# Patient Record
Sex: MALE | Race: WHITE | NOT HISPANIC OR LATINO | Employment: FULL TIME | URBAN - METROPOLITAN AREA
[De-identification: names, ages, dates, MRNs, and addresses within clinical notes are randomized per-mention and may not be internally consistent; named-entity substitution may affect disease eponyms.]

---

## 2024-08-30 ENCOUNTER — THERAPY VISIT (OUTPATIENT)
Dept: PHYSICAL THERAPY | Facility: CLINIC | Age: 29
End: 2024-08-30
Payer: COMMERCIAL

## 2024-08-30 DIAGNOSIS — M25.512 ACUTE PAIN OF LEFT SHOULDER: Primary | ICD-10-CM

## 2024-08-30 PROCEDURE — 97110 THERAPEUTIC EXERCISES: CPT | Mod: GP | Performed by: PHYSICAL THERAPIST

## 2024-08-30 PROCEDURE — 97161 PT EVAL LOW COMPLEX 20 MIN: CPT | Mod: GP | Performed by: PHYSICAL THERAPIST

## 2024-08-30 ASSESSMENT — ACTIVITIES OF DAILY LIVING (ADL)
WASHING_YOUR_BACK: 3
PLEASE_INDICATE_YOR_PRIMARY_REASON_FOR_REFERRAL_TO_THERAPY:: SHOULDER
SHOPPING: NO DIFFICULTY
PERFORMING_HEAVY_ACTIVITIES_AROUND_YOUR_HOME: A LITTLE BIT OF DIFFICULTY
LIFTING_AN_OBJECT,_LIKE_A_BAG_OF_GROCERIES_FROM_THE_FLOOR: A LITTLE BIT OF DIFFICULTY
RUNNING_ON_UNEVEN_GROUND: NO DIFFICULTY
PUSHING_WITH_THE_INVOLVED_ARM: 1
MAKING_SHARP_TURNS_WHILE_RUNNING_FAST: A LITTLE BIT OF DIFFICULTY
CARRYING_A_HEAVY_OBJECT_OF_10_POUNDS: 3
WASHING_YOUR_HAIR?: 0
GETTING_INTO_AND_OUT_OF_A_BATH: NO DIFFICULTY
PLACING_AN_OBJECT_ON_A_HIGH_SHELF: 4
LEFS_SCORE(%): 0
REACHING_FOR_SOMETHING_ON_A_HIGH_SHELF: 1
WALKING_BETWEEN_ROOMS: NO DIFFICULTY
SQUATTING: NO DIFFICULTY
PUTTING_ON_YOUR_PANTS: 1
LEFS_RAW_SCORE: 0
WALKING_A_MILE: NO DIFFICULTY
RUNNING_ON_EVEN_GROUND: NO DIFFICULTY
REMOVING_SOMETHING_FROM_YOUR_BACK_POCKET: 0
GETTING_INTO_OR_OUT_OF_A_CAR: A LITTLE BIT OF DIFFICULTY
ANY_OF_YOUR_USUAL_WORK,_HOUSEWORK_OR_SCHOOL_ACTIVITIES: NO DIFFICULTY
WHEN_LYING_ON_THE_INVOLVED_SIDE: 1
PUTTING_ON_AN_UNDERSHIRT_OR_A_PULLOVER_SWEATER: 2
PUTTING_ON_YOUR_SHOES_OR_SOCKS: NO DIFFICULTY
TOUCHING_THE_BACK_OF_YOUR_NECK: 0
PLEASE_INDICATE_YOR_PRIMARY_REASON_FOR_REFERRAL_TO_THERAPY:: FOOT AND/OR ANKLE
AT_ITS_WORST?: 1
WALKING_2_BLOCKS: NO DIFFICULTY
PERFORMING_LIGHT_ACTIVITIES_AROUND_YOUR_HOME: A LITTLE BIT OF DIFFICULTY
PUTTING_ON_A_SHIRT_THAT_BUTTONS_DOWN_THE_FRONT: 0
SITTING_FOR_1_HOUR: NO DIFFICULTY
GOING_UP_OR_DOWN_10_STAIRS: NO DIFFICULTY
YOUR_USUAL_HOBBIES,_RECREATIONAL_OR_SPORTING_ACTIVITIES: MODERATE DIFFICULTY
STANDING_FOR_1_HOUR: MODERATE DIFFICULTY
ROLLING_OVER_IN_BED: NO DIFFICULTY

## 2024-08-30 NOTE — PROGRESS NOTES
PHYSICAL THERAPY EVALUATION  Type of Visit: Evaluation       Fall Risk Screen:  Fall screen completed by: PT  Have you fallen 2 or more times in the past year?: No  Have you fallen and had an injury in the past year?: No  Is patient a fall risk?: No    Subjective  Pt reports that last year he was in [place] and had his left shoulder reduced on site after having a dislocation event improved with therapy at the time. New dislocation event and self reduced on a few months ago with the arm abducted and extended behind his body. Partial subluxation R shoulder in November and didn't seek care at the time there. Pain is worst with reaching/lifting, more dynamic activites. Denies vague symptoms. Would like to get rid of this pain and return to PLOF pain free. Wants to rock climb without issue, not have to think about the shoulder. Some clicking/popping in the right shoulder vs the left, some feeling of instability in the left shoulder. Otherwise moutain bikes and is very active.           Presenting condition or subjective complaint: left shoulder dislocation  Date of onset: 08/30/24    Relevant medical history:     Dates & types of surgery: hernia 1993    Prior diagnostic imaging/testing results: MRI; X-ray     Prior therapy history for the same diagnosis, illness or injury: Yes      Living Environment  Social support: Alone   Type of home: Apartment/condo   Stairs to enter the home:         Ramp: No   Stairs inside the home: No       Help at home: None  Equipment owned:       Employment: Yes   Hobbies/Interests: climbing mountain biking hiking guitar    Patient goals for therapy: rock climb and be comfortable rock scrambling    Pain assessment: Pain present  Location: lateral left arm/Rating: other     Objective   Posture: rounded shouders    Scapular positioning: mild medial/inferior border atrophy juan c shoulders left worse than right with eccentric flexion/abduction    *=painful    Shoulder AROM (* =  pain) Right Left           180    180*   ER/HBH 60 55   IR/HBB T10 T10     Shoulder PROM (* = pain) Right Left    170*    170*   ER In 90 abduction 90 In 90 abduction 85   IR In 90 abduction 60 In 90 abduction 60     Shoulder Strength (* = pain) Right Left   FL 5/5 5-/5   ABD 5/5 4/5   ER (0 abduction) 5/5 4+/5   ER (90 abduction) 4/5 4-/5*   IR 5/5 5/5   Biceps 5/5 5/5   Middle Trapezius 4/5 4-/5*   Lower Trapezius 4/5 4-/5*     Palpation tenderness: unremarkable    Other Tests: none    Assessment & Plan   CLINICAL IMPRESSIONS  Medical Diagnosis: Dislocation of left shoulder    Treatment Diagnosis: left shoulder pain   Impression/Assessment: Patient is a 28 year old male with right shoulder complaints.  The following significant findings have been identified: Pain, Decreased ROM/flexibility, Decreased joint mobility, Decreased strength, Impaired muscle performance, Decreased activity tolerance, and Impaired posture. These impairments interfere with their ability to perform self care tasks, work tasks, recreational activities, household chores, driving , household mobility, and community mobility as compared to previous level of function.     Clinical Decision Making (Complexity):  Clinical Presentation: Stable/Uncomplicated  Clinical Presentation Rationale: based on medical and personal factors listed in PT evaluation  Clinical Decision Making (Complexity): Low complexity    PLAN OF CARE  Treatment Interventions:  Interventions: Gait Training, Manual Therapy, Neuromuscular Re-education, Therapeutic Activity, Therapeutic Exercise    Long Term Goals     PT Goal 1  Goal Identifier: Reaching  Goal Description: Pt will be able to reach overhead, out to the side, behind the back and cross body pain free in order to reach cabinets, for dressing, and ADls  Rationale: to maximize safety and independence with performance of ADLs and functional tasks;to maximize safety and independence within the  home;to maximize safety and independence within the community;to maximize safety and independence with transportation;to maximize safety and independence with self cares  Goal Progress: new goal  Target Date: 10/25/24  PT Goal 2  Goal Identifier: Lifting  Goal Description: Pt will be able to lift > 5 pounds overhead pain free in order to place things into high spaces with ADLs at home, and perform daily tasks at home  Rationale: to maximize safety and independence with performance of ADLs and functional tasks;to maximize safety and independence within the home;to maximize safety and independence within the community;to maximize safety and independence with transportation;to maximize safety and independence with self cares  Goal Progress: new goal  Target Date: 10/25/24      Frequency of Treatment: 1 x week  Duration of Treatment: 8 weeks    Recommended Referrals to Other Professionals: none  Education Assessment:   Learner/Method: Patient;No Barriers to Learning  Education Comments: no concerns    Risks and benefits of evaluation/treatment have been explained.   Patient/Family/caregiver agrees with Plan of Care.     Evaluation Time:     PT Eval, Low Complexity Minutes (07747): 30     Signing Clinician: William Person PT

## 2024-09-24 ENCOUNTER — THERAPY VISIT (OUTPATIENT)
Dept: PHYSICAL THERAPY | Facility: CLINIC | Age: 29
End: 2024-09-24
Payer: COMMERCIAL

## 2024-09-24 DIAGNOSIS — M25.512 ACUTE PAIN OF LEFT SHOULDER: Primary | ICD-10-CM

## 2024-09-24 PROCEDURE — 97112 NEUROMUSCULAR REEDUCATION: CPT | Mod: GP | Performed by: PHYSICAL THERAPIST

## 2024-09-24 PROCEDURE — 97110 THERAPEUTIC EXERCISES: CPT | Mod: GP | Performed by: PHYSICAL THERAPIST

## 2024-10-28 ENCOUNTER — THERAPY VISIT (OUTPATIENT)
Dept: PHYSICAL THERAPY | Facility: CLINIC | Age: 29
End: 2024-10-28
Payer: COMMERCIAL

## 2024-10-28 DIAGNOSIS — M25.512 ACUTE PAIN OF LEFT SHOULDER: Primary | ICD-10-CM

## 2024-10-28 PROCEDURE — 97110 THERAPEUTIC EXERCISES: CPT | Mod: GP | Performed by: PHYSICAL THERAPIST

## 2024-11-12 ENCOUNTER — THERAPY VISIT (OUTPATIENT)
Dept: PHYSICAL THERAPY | Facility: CLINIC | Age: 29
End: 2024-11-12
Payer: COMMERCIAL

## 2024-11-12 DIAGNOSIS — M25.512 ACUTE PAIN OF LEFT SHOULDER: Primary | ICD-10-CM

## 2024-11-12 PROCEDURE — 97110 THERAPEUTIC EXERCISES: CPT | Mod: GP | Performed by: PHYSICAL THERAPIST

## 2024-11-12 NOTE — PROGRESS NOTES
PLAN  Patient was responding well to PT with full active and passive range and near 5/5 in all planes expect ER90 L shoulder. Performed unstable pushups, x 5 pullups, closed kinetic chain test x 16 in 15 sec without pain. At end of session while attempting [place] Island[place] gettup with 10# in right arm and side planking onto the left arm, felt a quick dislocation event in the left arm/instability. After laying down and gently abducting the left arm his shoulder reduced and ice was administered with education on icing tonight. Will call patient in the morning to discuss plan of care and see how he's feeling. Will change plan depending on patient response. Compass report submitted.        11/12/24 0500   Appointment Info   Signing clinician's name / credentials William Person, PT, DPT   Total/Authorized Visits ET   Visits Used 4   Medical Diagnosis Dislocation of left shoulder   PT Tx Diagnosis left shoulder pain   Other pertinent information rocking climbing,   Progress Note/Certification   Onset of illness/injury or Date of Surgery 08/30/24   Therapy Frequency 1 x week   Predicted Duration 8 weeks   Progress Note Due Date 10/25/24   Progress Note Completed Date 08/30/24   GOALS   PT Goals 2   PT Goal 1   Goal Identifier Reaching   Goal Description Pt will be able to reach overhead, out to the side, behind the back and cross body pain free in order to reach cabinets, for dressing, and ADls   Rationale to maximize safety and independence with performance of ADLs and functional tasks;to maximize safety and independence within the home;to maximize safety and independence within the community;to maximize safety and independence with transportation;to maximize safety and independence with self cares   Goal Progress mild progress   Target Date 10/25/24   PT Goal 2   Goal Identifier Lifting   Goal Description Pt will be able to lift > 5 pounds overhead pain free in order to place things into high spaces with ADLs at home, and  perform daily tasks at home   Rationale to maximize safety and independence with performance of ADLs and functional tasks;to maximize safety and independence within the home;to maximize safety and independence within the community;to maximize safety and independence with transportation;to maximize safety and independence with self cares   Goal Progress was progressing   Target Date 10/25/24   Subjective Report   Subjective Report Patricio reports that his shoulder is doing pretty good overall. The hanging exercise is really helpful. After dislocation, pain mostly reduced, mainly shocked by nature of the event   Objective Measures   Objective Measures Objective Measure 1;Objective Measure 2;Objective Measure 3;Objective Measure 4   Objective Measure 1   Objective Measure AROM   Details Flexion 175, Abduction 175, ER in 90 abduction 90, IR in 90 abduction 70   Objective Measure 2   Objective Measure MMT   Details ER 5/5, IR 5/5, Flexion 5/5, Abduction 5/5, MT 5/5*, LT 5/5, ER90 4/5   Objective Measure 3   Objective Measure PROM   Details Flexion 175, ER in 90 abduction 87, IR in 90 abduction 70   Objective Measure 4   Objective Measure dislocation event   Details spontaneous reduction with gentle 45 degrees abduction by PT   Treatment Interventions (PT)   Interventions Therapeutic Procedure/Exercise;Neuromuscular Re-education   Therapeutic Procedure/Exercise   Therapeutic Procedures: strength, endurance, ROM, flexibility minutes (73820) 30   Therapeutic Procedures Ther Proc 2   Ther Proc 1 Education   Ther Proc 1 - Details Discussed POC , tello/phys, HEP freq, activity mod   PTRx Ther Proc 1 Shoulder External Rotation Sidelying   PTRx Ther Proc 1 - Details progressed to 5# x 20   PTRx Ther Proc 2 Ball Prone Scapula I to T   PTRx Ther Proc 2 - Details HEP   PTRx Ther Proc 3 Ball Prone Scapula Y   PTRx Ther Proc 3 - Details HEP   PTRx Ther Proc 4 Upper Extremity CKC Functional Test    PTRx Ther Proc 4 - Details HEP   PTRx  Ther Proc 5 Theraband Shoulder External Rotation at 90/90   PTRx Ther Proc 5 - Details added press x 10B, tolerates well   PTRx Ther Proc 6 Pushups   PTRx Ther Proc 6 - Details back and forth x  20   PTRx Ther Proc 7 Shoulder Abduction   PTRx Ther Proc 7 - Details NT   Skilled Intervention did well until dislocation with [place] gettup   Patient Response/Progress did well until instability event   Ther Proc 2 [place] gettup   Ther Proc 2 - Details attempted with 10# with weight in right arm, while rolling onto the left shoulder he felt a dislocation event in the left shoulder, this reduced within 5 min after laying down with PT assited gentle abduction of the left shoulder.   PTRx Ther Proc 8 Lat Pulldown   PTRx Ther Proc 8 - Details x 5 pullups   Therapeutic Activity   PTRx Ther Act 1 Education Sheet Shoulder   PTRx Ther Act 1 - Details HEP   Neuromuscular Re-education   Neuromuscular re-ed of mvmt, balance, coord, kinesthetic sense, posture, proprioception minutes (68984) 5   PTRx Neuro Re-ed 1 Rowing with Shoulders Up and Back   PTRx Neuro Re-ed 1 - Details HEP   PTRx Neuro Re-ed 2 Shoulder Theraband Low Row/Pulldown   PTRx Neuro Re-ed 2 - Details HEP   PTRx Neuro Re-ed 3 BOSU Ball Push Up   PTRx Neuro Re-ed 3 - Details x 10   PTRx Neuro Re-ed 4 BOSU High Plank with Weight Shift   PTRx Neuro Re-ed 4 - Details x 10   Skilled Intervention cues for form   Patient Response/Progress tolerates well overall   Neuro Re-ed 1 pushup with ball roll   Neuro Re-ed 1 - Details x 10   Education   Learner/Method Patient;No Barriers to Learning   Education Comments no concerns   Plan   Home program Ptrx HEP   Plan for next session PN, dislocation/instability event   Comments   Comments recheck after instability event.   Total Session Time   Timed Code Treatment Minutes 35   Total Treatment Time (sum of timed and untimed services) 35     Unbilled time at end of session for gentle ice/education after compass/dislocation at end of  session. Will call patient 11/13/24 in the morning.       Beginning/End Dates of Progress Note Reporting Period:  08/30/24 to 11/12/2024    Referring Provider:  Referred Self    Inquires  William Person PT, DPT, CSCS  Physical Therapist  [facility] [address], [address] [address] [phone]

## 2024-11-13 ENCOUNTER — TELEPHONE (OUTPATIENT)
Dept: PHYSICAL THERAPY | Facility: CLINIC | Age: 29
End: 2024-11-13
Payer: COMMERCIAL

## 2024-11-13 DIAGNOSIS — M25.512 ACUTE PAIN OF LEFT SHOULDER: Primary | ICD-10-CM

## 2024-11-25 ENCOUNTER — THERAPY VISIT (OUTPATIENT)
Dept: PHYSICAL THERAPY | Facility: CLINIC | Age: 29
End: 2024-11-25
Payer: COMMERCIAL

## 2024-11-25 DIAGNOSIS — M25.512 ACUTE PAIN OF LEFT SHOULDER: Primary | ICD-10-CM

## 2024-11-25 PROCEDURE — 97110 THERAPEUTIC EXERCISES: CPT | Mod: GP | Performed by: PHYSICAL THERAPIST

## 2024-11-25 PROCEDURE — 97530 THERAPEUTIC ACTIVITIES: CPT | Mod: GP | Performed by: PHYSICAL THERAPIST

## 2024-11-25 NOTE — PROGRESS NOTES
Patricio is now two week s/p new dislocation event (3rd dislocation since Dec of 2023(other date in July 2024). Has improved since this event with some guarding, feelings of instability, and residual weakness. Will see Dr. Arora for further opinion on his left shoulder and continue PT in the meantime. His goal is to return to high level climbing and clinical concern currently is shoulders ability to stabilize under fatigued conditions. (This is the prior mechanism for previous dislocations).    PLAN  Continue therapy per current plan of care.     11/25/24 0500   Appointment Info   Signing clinician's name / credentials William Person, PT, DPT   Total/Authorized Visits ET   Visits Used 5   Medical Diagnosis Dislocation of left shoulder   PT Tx Diagnosis left shoulder pain   Precautions/Limitations 3 dislocations now   Other pertinent information rocking climbing,   Progress Note/Certification   Onset of illness/injury or Date of Surgery 08/30/24   Therapy Frequency 1 x week   Predicted Duration 8 weeks   Progress Note Due Date 10/25/24   Progress Note Completed Date 08/30/24   GOALS   PT Goals 2   PT Goal 1   Goal Identifier Reaching   Goal Description Pt will be able to reach overhead, out to the side, behind the back and cross body pain free in order to reach cabinets, for dressing, and ADls   Rationale to maximize safety and independence with performance of ADLs and functional tasks;to maximize safety and independence within the home;to maximize safety and independence within the community;to maximize safety and independence with transportation;to maximize safety and independence with self cares   Goal Progress mild progress   Target Date 10/25/24   PT Goal 2   Goal Identifier Lifting   Goal Description Pt will be able to lift > 5 pounds overhead pain free in order to place things into high spaces with ADLs at home, and perform daily tasks at home   Rationale to maximize safety and independence with performance of ADLs  and functional tasks;to maximize safety and independence within the home;to maximize safety and independence within the community;to maximize safety and independence with transportation;to maximize safety and independence with self cares   Goal Progress was progressing   Target Date 10/25/24   Subjective Report   Subjective Report nearly two weeks since dislocation. Keeping up with HEP given via PTrx by PT and shoulder has been pain free since 5 days post injury, Does feel a little bit more loose. Has been able to trail ride and mountain bike without issue.   Objective Measures   Objective Measures Objective Measure 1;Objective Measure 2;Objective Measure 3;Objective Measure 4   Objective Measure 1   Objective Measure AROM   Details Flexion 175, Abduction 175, ER 70, IR T10   Objective Measure 2   Objective Measure MMT   Details ER 5/5, IR 5/5, Flexion 5-/5, Abduction 5-/5(guarding)   Objective Measure 3   Objective Measure PROM   Details Flexion 170(guarded), ER in 90 abduction 85(guarded), IR in 90 abduction 55*(guarded)   Objective Measure 4   Objective Measure scapular rhythm   Details no winging with concentric/eccentric flexion/abduction today   Treatment Interventions (PT)   Interventions Therapeutic Procedure/Exercise;Neuromuscular Re-education;Therapeutic Activity   Therapeutic Procedure/Exercise   Therapeutic Procedures: strength, endurance, ROM, flexibility minutes (78457) 15   Therapeutic Procedures Ther Proc 2   Ther Proc 1 Education   Ther Proc 1 - Details Discussed POC , tello/phys, HEP freq, activity mod   PTRx Ther Proc 1 Shoulder External Rotation Sidelying   PTRx Ther Proc 1 - Details 2 pounds x 40 good challenge overall   PTRx Ther Proc 2 Ball Prone Scapula I to T   PTRx Ther Proc 2 - Details no weight x  20    PTRx Ther Proc 3 Ball Prone Scapula Y   PTRx Ther Proc 3 - Details no weight x 20   PTRx Ther Proc 4 Upper Extremity CKC Functional Test    PTRx Ther Proc 4 - Details NT   PTRx Ther Proc 5  Theraband Shoulder External Rotation at 90/90   PTRx Ther Proc 5 - Details NT   PTRx Ther Proc 6 Pushups   PTRx Ther Proc 6 - Details NT   PTRx Ther Proc 7 Shoulder Abduction   PTRx Ther Proc 7 - Details NT   PTRx Ther Proc 8 Lat Pulldown   PTRx Ther Proc 8 - Details NT   Skilled Intervention did well until dislocation with [place] gettup   Patient Response/Progress did well until instability event   PTRx Ther Proc 9 Isometric Shoulder External Rotation   PTRx Ther Proc 9 - Details No Notes   PTRx Ther Proc 10 Shoulder Isometric Internal Rotation   PTRx Ther Proc 10 - Details No Notes   PTRx Ther Proc 11 Isometric Shoulder Flexion   PTRx Ther Proc 11 - Details No Notes   PTRx Ther Proc 12 Shoulder Scaption Full Can   PTRx Ther Proc 12 - Details 5 pounds x 20 challenging    PTRx Ther Proc 13 Bent Over Rows   PTRx Ther Proc 13 - Details discussed progressing    Therapeutic Activity   PTRx Ther Act 1 Education Sheet Shoulder   PTRx Ther Act 1 - Details HEP   Therapeutic Activities: dynamic activities to improve functional performance minutes (11153) 25   Ther Act 1 education/reassessment   Ther Act 1 - Details full reassessment/discussion on current status of left shoulder, strength, instability and discussion on if returning to sports med is needed, upon discussion patient will see Dr. Arora next week to get another opinion on his shoulder to see if surgery is the right decision or not   Skilled Intervention skilled education   Patient Response/Progress verb understanding   Neuromuscular Re-education   Neuro Re-ed 1 pushup with ball roll   Neuro Re-ed 1 - Details NT   PTRx Neuro Re-ed 1 Rowing with Shoulders Up and Back   PTRx Neuro Re-ed 1 - Details HEP   PTRx Neuro Re-ed 2 Shoulder Theraband Low Row/Pulldown   PTRx Neuro Re-ed 2 - Details HEP   PTRx Neuro Re-ed 3 BOSU Ball Push Up   PTRx Neuro Re-ed 3 - Details No Notes   PTRx Neuro Re-ed 4 BOSU High Plank with Weight Shift   PTRx Neuro Re-ed 4 - Details No Notes    Skilled Intervention cues for form   Patient Response/Progress tolerates well overall   PTRx Neuro Re-ed 5 Scapular Stabilization/Proprioception With A Ball   PTRx Neuro Re-ed 5 - Details No Notes   Education   Learner/Method Patient;No Barriers to Learning   Education Comments no concerns   Plan   Home program Ptrx HEP   Plan for next session PN, continue PT in the meantime.   Comments   Comments recheck after instability event.   Total Session Time   Timed Code Treatment Minutes 40   Total Treatment Time (sum of timed and untimed services) 40       Beginning/End Dates of Progress Note Reporting Period:  08/30/24 to 11/25/2024    Referring Provider:  Referred Self

## 2024-12-02 ENCOUNTER — OFFICE VISIT (OUTPATIENT)
Dept: ORTHOPEDICS | Facility: CLINIC | Age: 29
End: 2024-12-02
Payer: COMMERCIAL

## 2024-12-02 VITALS — BODY MASS INDEX: 24.16 KG/M2 | WEIGHT: 145 LBS | HEIGHT: 65 IN

## 2024-12-02 DIAGNOSIS — S43.432A BANKART LESION OF LEFT SHOULDER, INITIAL ENCOUNTER: Primary | ICD-10-CM

## 2024-12-02 PROCEDURE — 99204 OFFICE O/P NEW MOD 45 MIN: CPT | Performed by: STUDENT IN AN ORGANIZED HEALTH CARE EDUCATION/TRAINING PROGRAM

## 2024-12-02 NOTE — PROGRESS NOTES
CC: Left shoulder dislocation    HPI: Patient is a 29-year-old male who presents with 2 episodes of left shoulder dislocation, first occurring 1 year ago.  Patient reports rockclimbing and reaching out with his left arm when he dislocated his left shoulder.  A climber in the area to help him reduce shoulder at that time.  He reports dislocating a second time while hiking in [place] when he reached out for a rock.  He was able to reduce it himself at that time.  He started participating in physical therapy and 2 weeks ago while performing a side plank he felt his shoulder sublux.  He reports little to no pain today in clinic.  No radiating pain or paresthesia.  He has not used any over-the-counter pain medication.  He reports being concerned that he will not be able to perform his hobbies like rockclimbing or hiking and would like to discuss possible surgical intervention.         Patient Active Problem List   Diagnosis    Acute pain of left shoulder        No past medical history on file.     No past surgical history on file.       No current outpatient medications on file.        No Known Allergies     No family history on file.       Social History     Tobacco Use    Smoking status: Not on file    Smokeless tobacco: Not on file   Substance Use Topics    Alcohol use: Not on file            Objective:  Physical Exam:  LUE: No gross deformity of the shoulder.  No open wounds or lacerations.  No surgical incisions.  No erythema or ecchymosis.  No pain to palpation over the clavicle, AC joint, acromion, or scapular spine.  No pain to palpation over the posterior joint line, lateral aspect of the shoulder, or long head of the biceps in the bicipital groove.  Passive range of motion 180 degrees forward flexion, 170 degrees abduction, 90 degrees external rotation, L1 internal rotation.  Active range of motion is equivalent to passive range of motion.  5/5 strength in flexion, abduction, internal and external rotation.   Negative cross body for AC joint pain.  Negative O'Barak's for pain over the AC joint or deep anterior shoulder.  Negative speeds for pain over the bicipital groove.  Negative Jobes for pain.  Positive apprehension with abduction and external rotation.  Positive  Anaya's test for apprehension or mechanical symptoms in the posterior shoulder.  Sensation intact to axillary, radial, median, and ulnar nerve distribution.    Imaging:  XR from 7/2/2024 including 2 views of his left shoulder were reviewed today in clinic.  Normal joint space narrowing.  Shoulder is correct anatomical alignment.  No evidence of fracture, tumor, dislocation    MRI arthrogram from 8/12/2024 was reviewed today in clinic.  Impression: Visible Hill-Sachs lesion.  20 mL a displaced anterior labrum and full-thickness tear of the posterior chondral labral junction intact root.  Intact rotator cuff and biceps tendon    Assessment and Plan: Patient is a 29-year-old male who presents with a history of 2 episodes of left shoulder dislocation, first occurring x 1 year ago and a episode of subluxation occurring x 2 weeks ago.  Clinically he has a positive overhead apprehension test and Kims test.  Radiographically he has a anterior and posterior labral tear.  Given that he has failed conservative treatment including physical therapy and avoidance high risk activities and that this is limiting him from participating in hobbies he enjoys, I believe he is a good conduit candidate for surgical intervention with labral repair and possible remplissage procedure.  We reviewed the procedure, possible risks, and outcome goals with the procedure.  He would like to proceed.  He is unsure if she will be employed at the end of this week and would like to contact the office once he knows his employment outcome.  He is provided with a surgical packet today in clinic.  My  will reach out to him once he can determine his employment.    [place] Bobo PA-C  participated in today's visit as part of his onboarding process.  Dr. Arora reviewed and agrees with plan.      I have read the above assessment and plan and agree.  This is a 29-year-old male who has now had recurrent instability of his left shoulder.  This is affecting his ability to do status desired activities including mountain biking and rockclimbing.  He is trialed physical therapy.  MRI shows a small Hill-Sachs lesion as well as a labral tear from the 3 o'clock position to 8 o'clock position.  I did opt to review his imaging with him today.  We discussed treatment options.  We discussed continued nonoperative management in the form of physical therapy and activity modification.  He continues to have feelings of instability including a recent subluxation event with nonoperative management.  Discussed with him that given his age and activity level, I would suspect he will continue to have instability symptoms moving forward as he is already had 3 instability events.  I discussed with him that with each shoulder dislocation he risks anterior glenoid bone loss and further increases risk of arthritis previous cussed operative intervention in the form of left shoulder arthroscopic labral repair with possible remplissage.  I did describe the operative technique of a labral repair and remplissage.  Discussed him that I would use an an intraoperative exam under anesthesia to help determine the necessity of the remplissage procedure.  I described the postoperative protocol.  Discussed the risk and benefits of operative intervention including but not limited to bleeding, infection, failure to cure pain, stiffness, posttraumatic arthritis, recurrent instability events, loss of limb and loss of life.  Discussed with him that the documented risk of instability is 5 to 7%.  I definitely answer his questions.  At this time he feels he is trialed exhausted nonoperative management.  Would like to move forward with operative  intervention.  Will get him scheduled for left shoulder arthroscopic labral repair and possible remplissage.    Wiliam Arora MD    [facility]   Department of Orthopedic Surgery    Disclaimer: This note consists of symbols derived from keyboarding, dictation and/or voice recognition software. As a result, there may be errors in the script that have gone undetected. Please consider this when interpreting information found in this chart.

## 2024-12-02 NOTE — LETTER
12/2/2024      Joshua Vu  [address]  [place] [address]      Dear Colleague,    Thank you for referring your patient, Joshua Vu, to the [facility] [place]. Please see a copy of my visit note below.    CC: Left shoulder dislocation    HPI: Patient is a 29-year-old male who presents with 2 episodes of left shoulder dislocation, first occurring 1 year ago.  Patient reports rockclimbing and reaching out with his left arm when he dislocated his left shoulder.  A climber in the area to help him reduce shoulder at that time.  He reports dislocating a second time while hiking in [place] when he reached out for a rock.  He was able to reduce it himself at that time.  He started participating in physical therapy and 2 weeks ago while performing a side plank he felt his shoulder sublux.  He reports little to no pain today in clinic.  No radiating pain or paresthesia.  He has not used any over-the-counter pain medication.  He reports being concerned that he will not be able to perform his hobbies like rockclimbing or hiking and would like to discuss possible surgical intervention.         Patient Active Problem List   Diagnosis     Acute pain of left shoulder        No past medical history on file.     No past surgical history on file.       No current outpatient medications on file.        No Known Allergies     No family history on file.       Social History     Tobacco Use     Smoking status: Not on file     Smokeless tobacco: Not on file   Substance Use Topics     Alcohol use: Not on file            Objective:  Physical Exam:  LUE: No gross deformity of the shoulder.  No open wounds or lacerations.  No surgical incisions.  No erythema or ecchymosis.  No pain to palpation over the clavicle, AC joint, acromion, or scapular spine.  No pain to palpation over the posterior joint line, lateral aspect of the shoulder, or long head of the biceps in the bicipital  groove.  Passive range of motion 180 degrees forward flexion, 170 degrees abduction, 90 degrees external rotation, L1 internal rotation.  Active range of motion is equivalent to passive range of motion.  5/5 strength in flexion, abduction, internal and external rotation.  Negative cross body for AC joint pain.  Negative O'Barak's for pain over the AC joint or deep anterior shoulder.  Negative speeds for pain over the bicipital groove.  Negative Jobes for pain.  Positive apprehension with abduction and external rotation.  Positive  Anaya's test for apprehension or mechanical symptoms in the posterior shoulder.  Sensation intact to axillary, radial, median, and ulnar nerve distribution.    Imaging:  XR from 7/2/2024 including 2 views of his left shoulder were reviewed today in clinic.  Normal joint space narrowing.  Shoulder is correct anatomical alignment.  No evidence of fracture, tumor, dislocation    MRI arthrogram from 8/12/2024 was reviewed today in clinic.  Impression: Visible Hill-Sachs lesion.  20 mL a displaced anterior labrum and full-thickness tear of the posterior chondral labral junction intact root.  Intact rotator cuff and biceps tendon    Assessment and Plan: Patient is a 29-year-old male who presents with a history of 2 episodes of left shoulder dislocation, first occurring x 1 year ago and a episode of subluxation occurring x 2 weeks ago.  Clinically he has a positive overhead apprehension test and Kims test.  Radiographically he has a anterior and posterior labral tear.  Given that he has failed conservative treatment including physical therapy and avoidance high risk activities and that this is limiting him from participating in hobbies he enjoys, I believe he is a good conduit candidate for surgical intervention with labral repair and possible remplissage procedure.  We reviewed the procedure, possible risks, and outcome goals with the procedure.  He would like to proceed.  He is unsure if she will  be employed at the end of this week and would like to contact the office once he knows his employment outcome.  He is provided with a surgical packet today in clinic.  My  will reach out to him once he can determine his employment.    [place] Bobo PA-C participated in today's visit as part of his onboarding process.  Dr. Arora reviewed and agrees with plan.      I have read the above assessment and plan and agree.  This is a 29-year-old male who has now had recurrent instability of his left shoulder.  This is affecting his ability to do status desired activities including mountain biking and rockclimbing.  He is trialed physical therapy.  MRI shows a small Hill-Sachs lesion as well as a labral tear from the 3 o'clock position to 8 o'clock position.  I did opt to review his imaging with him today.  We discussed treatment options.  We discussed continued nonoperative management in the form of physical therapy and activity modification.  He continues to have feelings of instability including a recent subluxation event with nonoperative management.  Discussed with him that given his age and activity level, I would suspect he will continue to have instability symptoms moving forward as he is already had 3 instability events.  I discussed with him that with each shoulder dislocation he risks anterior glenoid bone loss and further increases risk of arthritis previous cussed operative intervention in the form of left shoulder arthroscopic labral repair with possible remplissage.  I did describe the operative technique of a labral repair and remplissage.  Discussed him that I would use an an intraoperative exam under anesthesia to help determine the necessity of the remplissage procedure.  I described the postoperative protocol.  Discussed the risk and benefits of operative intervention including but not limited to bleeding, infection, failure to cure pain, stiffness, posttraumatic arthritis, recurrent  instability events, loss of limb and loss of life.  Discussed with him that the documented risk of instability is 5 to 7%.  I definitely answer his questions.  At this time he feels he is trialed exhausted nonoperative management.  Would like to move forward with operative intervention.  Will get him scheduled for left shoulder arthroscopic labral repair and possible remplissage.    Wiliam Arora MD    [facility]   Department of Orthopedic Surgery    Disclaimer: This note consists of symbols derived from keyboarding, dictation and/or voice recognition software. As a result, there may be errors in the script that have gone undetected. Please consider this when interpreting information found in this chart.         Again, thank you for allowing me to participate in the care of your patient.        Sincerely,        Wiliam Arora MD

## 2024-12-02 NOTE — PATIENT INSTRUCTIONS
[facility]   [address]  [place] [address] [address] [place] [address]   Appointments: [phone] Appointments: [phone]   Fax: [phone] Fax: [phone]       No diagnosis found.    SURGERY:  Schedule  surgery.   The Surgery Scheduler will contact you to assist with scheduling surgery.   You can contact her directly at [phone].       A pre-operative Physical with your primary care physician is required within 30 days of your scheduled proceedure  Physical Therapy will be scheduled         FORMS:   If you are needing any forms completed relating to your upcoming procedure, please send them to our office with a completed Release of Information.   Forms will be completed AFTER your procedure. A letter can be sent to your employer prior to surgery to inform them of your anticipated time off.    Please notify our staff if you would like a letter to do so.   Forms can be faxed directly to our clinic at [phone].     DO NOT BRING FORMS ON THE DATE OF SURGERY.         Call my office with any questions or concerns, [phone].

## 2024-12-18 ENCOUNTER — THERAPY VISIT (OUTPATIENT)
Dept: PHYSICAL THERAPY | Facility: CLINIC | Age: 29
End: 2024-12-18
Payer: COMMERCIAL

## 2024-12-18 DIAGNOSIS — M25.512 ACUTE PAIN OF LEFT SHOULDER: Primary | ICD-10-CM

## 2024-12-18 PROCEDURE — 97110 THERAPEUTIC EXERCISES: CPT | Mod: GP | Performed by: PHYSICAL THERAPIST

## 2024-12-18 PROCEDURE — 97530 THERAPEUTIC ACTIVITIES: CPT | Mod: GP | Performed by: PHYSICAL THERAPIST

## 2024-12-19 ENCOUNTER — TELEPHONE (OUTPATIENT)
Dept: ORTHOPEDICS | Facility: CLINIC | Age: 29
End: 2024-12-19
Payer: COMMERCIAL

## 2024-12-19 DIAGNOSIS — S43.432A BANKART LESION OF LEFT SHOULDER, INITIAL ENCOUNTER: ICD-10-CM

## 2024-12-19 DIAGNOSIS — Z98.890 STATUS POST LABRAL REPAIR OF SHOULDER: Primary | ICD-10-CM

## 2024-12-19 NOTE — PROGRESS NOTES
Discharge to [facility] at this time, will return after surgery on left shoulder for further PT. Near full AROM/PROM at this time and > 4/5 strength in all planes, education on POC before/after possible surgery.     DISCHARGE  Reason for Discharge: Tasha having surgery on 1/22/25 via Dr. Arora    Equipment Issued: theraband     12/18/24 0500   Appointment Info   Signing clinician's name / credentials William Person, PT, DPT   Total/Authorized Visits ET   Visits Used 6   Medical Diagnosis Dislocation of left shoulder   PT Tx Diagnosis left shoulder pain   Precautions/Limitations 3 dislocations now   Other pertinent information rocking climbing,   Progress Note/Certification   Onset of illness/injury or Date of Surgery 08/30/24   Therapy Frequency 1 x week   Predicted Duration 8 weeks   Progress Note Due Date 10/25/24   Progress Note Completed Date 08/30/24   GOALS   PT Goals 2   PT Goal 1   Goal Identifier Reaching   Goal Description Pt will be able to reach overhead, out to the side, behind the back and cross body pain free in order to reach cabinets, for dressing, and ADls   Rationale to maximize safety and independence with performance of ADLs and functional tasks;to maximize safety and independence within the home;to maximize safety and independence within the community;to maximize safety and independence with transportation;to maximize safety and independence with self cares   Goal Progress limited progress   Target Date 10/25/24   PT Goal 2   Goal Identifier Lifting   Goal Description Pt will be able to lift > 5 pounds overhead pain free in order to place things into high spaces with ADLs at home, and perform daily tasks at home   Rationale to maximize safety and independence with performance of ADLs and functional tasks;to maximize safety and independence within the home;to maximize safety and independence within the community;to maximize safety and independence with transportation;to maximize safety and  independence with self cares   Goal Progress limited progress,   Target Date 10/25/24   Subjective Report   Subjective Report nearly two weeks since dislocation. Keeping up with HEP given via PTrx by PT and shoulder has been pain free since 5 days post injury, Does feel a little bit more loose. Has been able to trail ride and mountain bike without issue.   Objective Measures   Objective Measures Objective Measure 1;Objective Measure 2;Objective Measure 3;Objective Measure 4;Objective Measure 5   Objective Measure 1   Objective Measure AROM   Details Flexion 180, Abduction 180, ER 70, IR T7   Objective Measure 2   Objective Measure MMT   Details ER 5-/5, IR 5/5, Flexion 5-/5, Abduction 4/5   Objective Measure 3   Objective Measure PROM   Details Flexion 175, ER in 90 abduction 90, IR in 90 abduction 65   Objective Measure 4   Objective Measure palpation   Details anterior left shoulder tender   Treatment Interventions (PT)   Interventions Therapeutic Procedure/Exercise;Neuromuscular Re-education;Therapeutic Activity   Therapeutic Procedure/Exercise   Therapeutic Procedures: strength, endurance, ROM, flexibility minutes (31265) 10   Therapeutic Procedures Ther Proc 2   Ther Proc 1 Education   Ther Proc 1 - Details Discussed POC , tello/phys, HEP freq, activity mod   PTRx Ther Proc 1 Shoulder External Rotation Sidelying   PTRx Ther Proc 1 - Details HEP   PTRx Ther Proc 2 Ball Prone Scapula I to T   PTRx Ther Proc 2 - Details HEP   PTRx Ther Proc 3 Ball Prone Scapula Y   PTRx Ther Proc 3 - Details HEP   PTRx Ther Proc 5 Isometric Shoulder External Rotation   PTRx Ther Proc 5 - Details No Notes   PTRx Ther Proc 6 Shoulder Isometric Internal Rotation   PTRx Ther Proc 6 - Details No Notes   PTRx Ther Proc 7 Isometric Shoulder Flexion   PTRx Ther Proc 7 - Details No Notes   PTRx Ther Proc 8 Shoulder Scaption Full Can   PTRx Ther Proc 8 - Details 5 pounds x 20 challenging    Skilled Intervention updated HEP   Patient  Response/Progress verb understanding   Therapeutic Activity   Therapeutic Activities: dynamic activities to improve functional performance minutes (57881) 20   Ther Act 1 education/reassessment   Ther Act 1 - Details discussion post op considerations, sleeping positions, driving, etc. x 20 min   PTRx Ther Act 1 Education Sheet Shoulder   PTRx Ther Act 1 - Details HEP   Skilled Intervention skilled education   Patient Response/Progress verb understanding   Neuromuscular Re-education   PTRx Neuro Re-ed 1 Rowing with Shoulders Up and Back   PTRx Neuro Re-ed 1 - Details HEP   PTRx Neuro Re-ed 2 Shoulder Theraband Low Row/Pulldown   PTRx Neuro Re-ed 2 - Details HEP   PTRx Neuro Re-ed 3 Scapular Stabilization/Proprioception With A Ball   PTRx Neuro Re-ed 3 - Details No Notes   Education   Learner/Method Patient;No Barriers to Learning   Education Comments no concerns   Plan   Home program Ptrx HEP   Plan for next session PN/dc return for new eval after surgery   Total Session Time   Timed Code Treatment Minutes 30   Total Treatment Time (sum of timed and untimed services) 30     Discharge Plan: Patient to continue home program.    Referring Provider:  Referred Self    Inquires  William Person PT, DPT, CSCS  Physical Therapist  [facility] [place]  [address], [address] [place] [address] [phone]

## 2024-12-19 NOTE — TELEPHONE ENCOUNTER
"Teaching Flowsheet   Relevant Diagnosis:   Bankart lesion of left shoulder, initial encounter [S48.959B]  - Primary     Teaching Topic: 1/22 Left shoulder arthroscopy Labral repair, possible Remplissage with Dr. Arora at [facility].    Orders placed for Physical therapy using the \"[facility] shoulder instability postoperative\" starting 1 week after surgery.   Special Needs: UltraSling (Shemar), undecided on the NICE1   CASSIDY 1/16 at [facility]     Person(s) involved in teaching:   Patient     Motivation Level:  Asks Questions: Yes  Eager to Learn: Yes  Cooperative: Yes  Receptive (willing/able to accept information): Yes  Any cultural factors/Sabianism beliefs that may influence understanding or compliance? No     Patient demonstrates understanding of the following:  Reason for the appointment, diagnosis and treatment plan: Yes  Knowledge of proper use of medications and conditions for which they are ordered (with special attention to potential side effects or drug interactions): Yes  Which situations necessitate calling provider and whom to contact: Yes     Teaching Concerns Addressed: He doesn't have a ride arranged yet.  He is working on that.  We discussed things he can do to get his house ready for handling things on his own.  Opening up toilet paper pkgs, frozen food boxes, or other items that take two hands now.  Meal prep in easy to open containers or have take out menus by the phone.  Clothing that is easy to put on and off.  Joggers, button up shirts instead of pull overs, lay out options in case he decides on style works better than the others.  Make it easy to access.  We discussed returning to work.  He was thinking a couple of weeks but I referred him to the back of the [facility] patient education Q&A where it speaks more toward the 4-6 week timeline.  Joshua is thinking he'd like to be off of narcotics in 2-3 weeks but that doesn't mean he'll be out of the sling yet.  This " is his first surgery so we'll have to take a wait and see approach to see how he heals.           Proper use and care of Ultrasling (medical equip, care aids, etc.): Yes  Nutritional needs and diet plan: Yes  Pain management techniques: Yes  Wound Care: Yes  How and/when to access community resources: Yes     Instructional Materials Used/Given: Reviewed same-day surgery instructions with patient (NPO, showering, stoplight tool, need for pre-op H&P within 30 days of procedure, and responsible adult /person to stay with patient for 24 hours post surgery)

## 2024-12-26 PROBLEM — M25.512 ACUTE PAIN OF LEFT SHOULDER: Status: RESOLVED | Noted: 2024-08-30 | Resolved: 2024-12-26

## 2025-01-20 ENCOUNTER — MEDICAL CORRESPONDENCE (OUTPATIENT)
Dept: HEALTH INFORMATION MANAGEMENT | Facility: CLINIC | Age: 30
End: 2025-01-20
Payer: COMMERCIAL

## 2025-01-22 ENCOUNTER — NON-VISIT BILLABLE ENCOUNTER (OUTPATIENT)
Dept: ORTHOPEDICS | Facility: CLINIC | Age: 30
End: 2025-01-22
Payer: COMMERCIAL

## 2025-01-22 DIAGNOSIS — Z98.890 S/P ARTHROSCOPY OF LEFT SHOULDER: Primary | ICD-10-CM

## 2025-01-22 RX ORDER — ACETAMINOPHEN 500 MG
500-1000 TABLET ORAL EVERY 6 HOURS PRN
Qty: 90 TABLET | Refills: 0 | Status: SHIPPED | OUTPATIENT
Start: 2025-01-22

## 2025-01-22 RX ORDER — ONDANSETRON 4 MG/1
4 TABLET, ORALLY DISINTEGRATING ORAL EVERY 8 HOURS PRN
Qty: 10 TABLET | Refills: 0 | Status: SHIPPED | OUTPATIENT
Start: 2025-01-22

## 2025-01-22 RX ORDER — IBUPROFEN 600 MG/1
600 TABLET, FILM COATED ORAL EVERY 6 HOURS PRN
Qty: 90 TABLET | Refills: 0 | Status: SHIPPED | OUTPATIENT
Start: 2025-01-22

## 2025-01-22 RX ORDER — OXYCODONE HYDROCHLORIDE 5 MG/1
5-10 TABLET ORAL EVERY 6 HOURS PRN
Qty: 40 TABLET | Refills: 0 | Status: SHIPPED | OUTPATIENT
Start: 2025-01-22

## 2025-01-22 NOTE — PROCEDURES
Date of Surgery: January 22, 2025    Location: [facility] [place] [facility]    Surgeon: Wiliam Arora MD     Assistants:   1. Ryan Thornton PA-C  2. Jeanette Benítez, PGY-3    A skilled surgical assistant was required to assist with patient positioning, draping, and arthroscopy assistance during anchor placement.    Pre-operative Diagnosis:   1) Left Bankart Labral Tear  2) Left Hill Sachs Humeral Lesion     Post-operative Diagnosis:   1) Left Bankart Labral Tear  2) Left Hill Sachs Humeral Lesion     Procedure:   1) Left Shoulder Arthroscopic Bankart Labral Repair - 10284-82  2) Left Shoulder Remplissage - [id number]     Requesting a 22 modifier as this labral repair required a remplissage procedure for the Hill-Sachs lesion.  This is an uncoded procedure.  It requires creation of additional portals and implant placement.  This requires additional arthroscopic expertise and operative time.     Implants:  Arthrex 1.9mm FiberTak Knotless x6, Arthrex 2.6 FiberTak Knotless x2     Anesthesia: general with block     Estimated Blood Loss: 10 ml        Complications: None        Specimen: None     Tourniquette Time: 0 min     Condition: Stable to PACU     Procedure Details   Indications for Procedure:  Patient is a 29-year-old male known to my practice with recurrent left shoulder instability.  For full history and physical please see my clinic note.  He has now had a 2-3 instability events  MRI at this time shows anterior-inferior labral repair with diminutive labrum as well as a deep superiorHill-Sachs lesion.  I reviewed his imaging.  Discussed operative and nonoperative options.  Discussed nonoperative management the form of continued physical therapy and brace usage.  Discussed operative management in the form of arthroscopic labral repair and possible remplissage procedure.  Described the operative technique of both.  Discussed the risks and benefits of operative intervention including but not limited to  bleeding, infection, failure to cure pain, recurrent instability, posttraumatic arthritis, stiffness, loss of limb and loss of life.  Specifically I discussed with him the risk of recurrent instability is approximately 7%.  There is risk would be greater than 50% with nonoperative management given him recurrent instability, age, and activity level.  I discussed the indications to add a remplissage procedure.  I had the opportunity answer his questions.  He wished to move forward with operative intervention.     Procedure Details:  On the day of surgery the patient was met in the preoperative holding area.  The correct limb was confirmed and marked with a permanent skin marker.  Informed consent was again reviewed confirming the correct patient, site, procedure, and surgeon.  I discussed the risks and benefits of operative intervention as well as nonoperative alternatives.  I had the option answer questions.  He wished to move forward with operative intervention.  A regional nerve block was performed by my anesthesia colleagues.     The patient was then brought back to the operative suite.  He was transferred from the hospital bed to the operative table without incident.  General anesthesia was induced by my anesthesia colleagues.  He was placed in the lateral decubitus position.  I performed a load-and-shift examination.  I was able to dislocate the humeral head anteriorly.  No subluxation or dislocation posteriorly.  The left shoulder was sterilely prepped and draped in normal fashion.  Traction and axillary distraction was provided by the SSS system.     A final timeout was performed with all in the room in agreement the correct patient, site, procedure, and surgeon, as well as preoperative antibiotics have been instilled.  A total of 10 cc of 0.25% Marcaine without epinephrine was injected into the portal sites.  I first created the superior posterior portal with an 11 blade.  The trocar was inserted.  I  localized the position of the superior anterior portal with a spinal needle.  This was created with an 11 blade and a purple cannula was inserted.  I then began the diagnostic arthroscopy.  There is noted to be no bone loss off the glenoid.  There was no labral visible off the anterior aspect of the glenoid from 5:00 to 10:00.  There is noted to be significant laxity of the soft tissue here.  The posterior labrum was noted to be frayed and mildly torn from 5:00 to 6:00.  The remainder of the posterior labrum was frayed but intact.  There was noted to be fraying of the superior labrum at the biceps anchor but no sign of biceps anchor instability.There was noted to be a wide Hill-Sachs lesion.  This was quite deep at the superior aspect.  There is significant cartilage loss from the posterior superior humeral head.    I then evaluated the insertion of the subscapularis.  I do not appreciate pathology.  I evaluated the insertion of the supraspinatus, infraspinatus and teres minor and they were intact without pathology.  I evaluated the infraglenoid space.  I do not appreciate a HAGL lesion or loose body.     We therefore set about to repair the labrum.  I also elected to perform a remplissage procedure given his Hill-Sachs lesion  and the diminutive nature of his anterior-inferior labrum.  I first localized and created the anterior inferior portal and placed a focal cannula here.  I then utilized a spinal needle to locate the posterior inferior portal.  And placed a purple cannula here.  I then released the axillary distraction with adjustable strap.  I set about to first place her anchors for the remplissage procedure.  I utilized the metal guide for the 2 6 fiber tack.  The purple cannulas backed out above the infraspinatus tendon.  I appears the infraspinatus tendon and the capsule at the inferior aspect of the Hill-Sachs lesion.  A 2 6 fiber tack self punching anchor with knotless mechanism was then malleted into  place at the inferior aspect of the lesion, abutting the infraspinatus insertion.  This achieved good fixation.  The metal guide was then reinserted and I selected a new location at the superior aspect of the lesion.  The metal guide was pushed through the infraspinatus tendon and the capsule.  A second of the 2 6 fiber tack self punching anchors with knotless mechanism was malleted into place here.  The metal trocar was then removed.  The femoral cannula remained in place above the infraspinatus.  The fixation stitch from the superior anchor was passed through the passing loop of the inferior anchor and shuttled to the knotless mechanism.  This was then repeated with the working stitch from the inferior anchor through the knotless mechanism of the superior anchor.  They were gently tensioned.     I then turned my attention back to our labral repair.  Using the labral elevator, I elevated what remained of the anterior inferior labrum from the glenoid neck from the 3 o'clock position down to the 6 o'clock position.  I then elevated the posterior labrum back to the 7 o'clock position.  This achieved good mobility.  I switched the camera to the high anterior portal.  We first worked through the low posterior portal.  I utilized the suture lasso passer to pass the nitinol loop through the labrum at the 6 o'clock position.  He was retrieved out the anterior inferior portal.  I then placed the guide for a 1.9 mm knotless fiber tack at the 6 o'clock position on the face of the glenoid.  This was drilled and malleted into place.  Achieve good fixation.  The working stitch was then passed through the labrum using the nitinol loop.  It was then retrieved back out the low posterior portal and passed through the looped end of the passing suture and passed through the knotless mechanism.  While my assistant held the camera, I pulled the labrum onto the face of the glenoid with a grasper and gently tensioned the anchor.  It  achieved good fixation.  I then repeated this process with a second anchor at the 5 o'clock position.  Both anchors were sequentially tensioned.  Excess suture was cut with a flush cutter.     I then returned the camera to the high posterior portal.  I passed the nitinol wire through the labrum at the 7 o'clock position.  I placed a third anchor at the 7 o/clock position which was achieved good fixation.   This achieved good fixation.  I placed 3 additional anchors at the 8, 9 and 10 o clock positions.  All anchors were gently tension.  Excess suture was cut.     At this time point, I was satisfied with the repair of the labrum and anterior capsule.  Traction was released from the arm and axillary distraction.  The humeral head sat centered in the glenoid.  At this time the fixation stitches from the remplissage were then tensioned and excess suture was cut.     The patient was awoken from general anesthesia without incident.  He was transferred from the operative table to hospital bed without incident.  She returned to PACU in stable condition.     All sponge, needle, and instrument counts were correct at the end of the case.     Post-Operative Plan:  Patient will be discharged from PACU and meeting discharge criteria.  I prescribed her oral pain medication to the outpatient pharmacy.  He has a 1 pound lifting restriction in her sling.  He is to remain in her sling except for clothing and showering.  He will start physical therapy next week per the [facility] glenohumeral instability postoperative protocol.  He will follow-up with me in 2 weeks for suture removal     Wiliam Arora MD    [facility]   Department of Orthopedic Surgery        Disclaimer: This note consists of symbols derived from keyboarding, dictation and/or voice recognition software. As a result, there may be errors in the script that have gone undetected. Please consider this when interpreting  information found in this chart.

## 2025-01-29 ENCOUNTER — THERAPY VISIT (OUTPATIENT)
Dept: PHYSICAL THERAPY | Facility: CLINIC | Age: 30
End: 2025-01-29
Attending: STUDENT IN AN ORGANIZED HEALTH CARE EDUCATION/TRAINING PROGRAM
Payer: COMMERCIAL

## 2025-01-29 DIAGNOSIS — Z98.890 STATUS POST LABRAL REPAIR OF SHOULDER: ICD-10-CM

## 2025-01-29 DIAGNOSIS — S43.432A BANKART LESION OF LEFT SHOULDER, INITIAL ENCOUNTER: ICD-10-CM

## 2025-01-29 ASSESSMENT — ACTIVITIES OF DAILY LIVING (ADL)
WASHING_YOUR_HAIR?: 8
AT_ITS_WORST?: 3
PUTTING_ON_AN_UNDERSHIRT_OR_A_PULLOVER_SWEATER: 7
PLEASE_INDICATE_YOR_PRIMARY_REASON_FOR_REFERRAL_TO_THERAPY:: SHOULDER
PUTTING_ON_A_SHIRT_THAT_BUTTONS_DOWN_THE_FRONT: 5
PUTTING_ON_YOUR_PANTS: 5
PLACING_AN_OBJECT_ON_A_HIGH_SHELF: 2
WASHING_YOUR_BACK: 10

## 2025-01-29 NOTE — PROGRESS NOTES
PHYSICAL THERAPY EVALUATION  Type of Visit: Evaluation       Fall Risk Screen:  Fall screen completed by: PT  Have you fallen 2 or more times in the past year?: (Patient-Rptd) No  Have you fallen and had an injury in the past year?: (Patient-Rptd) No  Is patient a fall risk?: No    Subjective  Presents to PT s/p left shoulder anterior labral Bankart repair with remplissage(6 anterior anchors, 2 in back for remplissage) on 25 via Dr. Arora. Pt reports that he's feeling good overall and is sleeping well. Alternating tylenol/ibuprofen along with oxy for pain management. Denies vague symptoms. Would like to get rid of this pain and return to PLOF pain free. Long term goal climb and be highly active without shoulder pain.          Presenting condition or subjective complaint: shoulder surgery  Date of onset: 25    Relevant medical history: Vision problems   Dates & types of surgery:      Prior diagnostic imaging/testing results: MRI; CT scan; X-ray     Prior therapy history for the same diagnosis, illness or injury: Yes      Living Environment  Social support: Alone   Type of home: Apartment/condo   Stairs to enter the home:         Ramp: No   Stairs inside the home: No       Help at home: None  Equipment owned:       Employment: Yes   Hobbies/Interests: climbing biking reading    Patient goals for therapy: climb the [place] with confidence    Pain assessment: Pain present  Location: left shoulder/Ratin/10     Objective   Posture: forward head, rounded shoulders in sling    *=painful    Shoulder AROM (* = pain) Right Left           NT    NT   ER/HBH 70 NT   IR/HBB T7 NT     Shoulder PROM (* = pain) Right Left    90    45   ER In 90 abduction 90 In 30 abduction 20   IR In 90 abduction 70 In 30 abduction 20     Palpation tenderness: unremarkable    Other Tests: none    Assessment & Plan   CLINICAL IMPRESSIONS  Medical Diagnosis: Bankart lesion of left shoulder,  Status post labral repair of left shoulder    Treatment Diagnosis: Left shoulder pain, aftercare s/p Left shoulder labral repair   Impression/Assessment: Patient is a 29 year old male with left shoulder complaints.  The following significant findings have been identified: Pain, Decreased ROM/flexibility, Decreased joint mobility, Decreased strength, Impaired muscle performance, Decreased activity tolerance, and Impaired posture. These impairments interfere with their ability to perform self care tasks, work tasks, recreational activities, household chores, driving , household mobility, and community mobility as compared to previous level of function.     Clinical Decision Making (Complexity):  Clinical Presentation: Stable/Uncomplicated  Clinical Presentation Rationale: based on medical and personal factors listed in PT evaluation  Clinical Decision Making (Complexity): Low complexity    PLAN OF CARE  Treatment Interventions:  Interventions: Manual Therapy, Neuromuscular Re-education, Therapeutic Activity, Therapeutic Exercise, Self-Care/Home Management    Long Term Goals     PT Goal 1  Goal Identifier: Reaching  Goal Description: Pt will be able to reach overhead, out to the side, behind the back and cross body pain free in order to reach cabinets, for dressing, and ADls  Rationale: to maximize safety and independence with performance of ADLs and functional tasks;to maximize safety and independence within the home;to maximize safety and independence within the community;to maximize safety and independence with transportation;to maximize safety and independence with self cares  Goal Progress: new goal  Target Date: 04/23/25  PT Goal 2  Goal Identifier: Lifting  Goal Description: Pt will be able to lift > 5 pounds overhead pain free in order to place things into high spaces with ADLs at home, and perform daily tasks at home  Rationale: to maximize safety and independence with performance of ADLs and functional  tasks;to maximize safety and independence within the home;to maximize safety and independence within the community;to maximize safety and independence with transportation;to maximize safety and independence with self cares  Goal Progress: new goal  Target Date: 05/21/25  PT Goal 3  Goal Identifier: Sports  Goal Description: Patient will be able to return to full sport/lesuire activity pain free without modification to level equal or greater than level prior to injury  Rationale: to maximize safety and independence within the community;to maximize safety and independence within the home;to maximize safety and independence with performance of ADLs and functional tasks;to maximize safety and independence with self cares;to maximize safety and independence with transportation  Goal Progress: new goal  Target Date: 07/29/25      Frequency of Treatment: 2 x week  Duration of Treatment: 6 weeks, 1 x week for 6 weeks, 2 x month for 3 months    Recommended Referrals to Other Professionals: none  Education Assessment:   Learner/Method: Patient;No Barriers to Learning  Education Comments: no concerns    Risks and benefits of evaluation/treatment have been explained.   Patient/Family/caregiver agrees with Plan of Care.     Evaluation Time:     PT Eval, Low Complexity Minutes (12640): 15     Signing Clinician: William Person PT

## 2025-01-30 ENCOUNTER — MYC MEDICAL ADVICE (OUTPATIENT)
Dept: ORTHOPEDICS | Facility: CLINIC | Age: 30
End: 2025-01-30
Payer: COMMERCIAL

## 2025-02-05 ENCOUNTER — THERAPY VISIT (OUTPATIENT)
Dept: PHYSICAL THERAPY | Facility: CLINIC | Age: 30
End: 2025-02-05
Attending: STUDENT IN AN ORGANIZED HEALTH CARE EDUCATION/TRAINING PROGRAM
Payer: COMMERCIAL

## 2025-02-05 DIAGNOSIS — Z98.890 STATUS POST LABRAL REPAIR OF SHOULDER: ICD-10-CM

## 2025-02-05 DIAGNOSIS — S43.432A BANKART LESION OF LEFT SHOULDER, INITIAL ENCOUNTER: Primary | ICD-10-CM

## 2025-02-05 PROCEDURE — 97110 THERAPEUTIC EXERCISES: CPT | Mod: GP | Performed by: PHYSICAL THERAPIST

## 2025-02-10 ENCOUNTER — THERAPY VISIT (OUTPATIENT)
Dept: PHYSICAL THERAPY | Facility: CLINIC | Age: 30
End: 2025-02-10
Payer: COMMERCIAL

## 2025-02-10 DIAGNOSIS — Z98.890 STATUS POST LABRAL REPAIR OF SHOULDER: ICD-10-CM

## 2025-02-10 DIAGNOSIS — S43.432A BANKART LESION OF LEFT SHOULDER, INITIAL ENCOUNTER: Primary | ICD-10-CM

## 2025-02-10 PROCEDURE — 97110 THERAPEUTIC EXERCISES: CPT | Mod: GP | Performed by: PHYSICAL THERAPIST

## 2025-02-11 NOTE — PROGRESS NOTES
HISTORY OF PRESENT ILLNESS:    Joshua Vu is a 29 year old male who is seen in follow up for Left Shoulder Arthroscopic Bankart Labral Repair, Left Shoulder Remplissage (DOS:1/22/2025) with Dr. Arora  Today: Joshua notes that he continues to improve since his surgery.  States his pain is well-controlled with the use of ibuprofen and Tylenol.  Discontinued oxycodone after 1 week.  He has been wearing the sling full-time while up and moving, and removing it at rest.  Participating in physical therapy in [place] with William and continuing his at home exercises, including pendulum and elbow range of motion.  He states he is happy with his current progression.  Sutures remain intact. Denies Chest pain, Calve pain, Fever, Chills.    PHYSICAL EXAM:  There were no vitals taken for this visit.  There is no weight on file to calculate BMI.   GENERAL APPEARANCE: healthy, alert and no distress   PSYCH: mentation appears normal and affect normal/bright    MSK:  Left Shoulder  Incision clean and dry, Sutures present, healing.  Normal postop arthroscopic portal incisional erythema.   Ecchymosis: None.  Edema: None  CMS: pema incisional numbness, otherwise grossly intact to digits.  AROM: Full wrist/elbow ROM. Shoulder ROM deferred at this time    Radiology:   Intraoperative arthroscopic images reviewed with patient.      ASSESSMENT:  Joshua Vu is a 29 year old male who is seen in follow up for Left Shoulder Arthroscopic Bankart Labral Repair, Left Shoulder Remplissage (DOS:1/22/2025) with Dr. Arora, improving appropriately.      PLAN:  - Surgery discussed, images reviewed, and all questions were answered at this time.  - Sutures removed with sterile technique, steri-strips applied in usual fashion, care instructions given and verbally acknowledged.  - May allow soap/water to run over incision. Avoid submerging in pool/tub x2 weeks.  - Medications: OTC PRN, discussed that he may discontinue ibuprofen and  Tylenol as tolerated.  - Physical Therapy: As instructed / RICE and PROM. Reviewed shoulder pendulum exercises today in clinic. Patient performed pendulum exercises today in clinic.  Continue to work on at home and with formal PT.  Patient noted that he had a copy of physical therapy instructions and will continue to follow guidelines.  -Shoulder immobilizer: Will remain in shoulder immobilizer full-time for a minimum of 6 weeks total.  Will review at next postoperative visit on 3/10/2025.  Discussed importance of wearing shoulder immobilizer while up and moving.  May remove for shoulder, elbow, wrist range of motion exercises.      Return on 3/10/2025 for recheck with Dr. Maite Bobo, PAOmidC  Physician Assistant   Oncology and Adult Reconstructive Surgery  Dept Orthopaedic Surgery, [facility]      2/12/2025

## 2025-02-12 ENCOUNTER — OFFICE VISIT (OUTPATIENT)
Dept: ORTHOPEDICS | Facility: CLINIC | Age: 30
End: 2025-02-12
Payer: COMMERCIAL

## 2025-02-12 VITALS — SYSTOLIC BLOOD PRESSURE: 127 MMHG | DIASTOLIC BLOOD PRESSURE: 82 MMHG

## 2025-02-12 DIAGNOSIS — Z98.890 S/P ARTHROSCOPY OF LEFT SHOULDER: ICD-10-CM

## 2025-02-12 DIAGNOSIS — Z48.89 ENCOUNTER FOR POSTOPERATIVE CARE: Primary | ICD-10-CM

## 2025-02-12 NOTE — LETTER
2/12/2025      Joshua Vu  [address]  [place] [address]      Dear Colleague,    Thank you for referring your patient, Joshua Vu, to the [facility] [place]. Please see a copy of my visit note below.    HISTORY OF PRESENT ILLNESS:    Joshua Vu is a 29 year old male who is seen in follow up for Left Shoulder Arthroscopic Bankart Labral Repair, Left Shoulder Remplissage (DOS:1/22/2025) with Dr. Arora  Today: Joshua notes that he continues to improve since his surgery.  States his pain is well-controlled with the use of ibuprofen and Tylenol.  Discontinued oxycodone after 1 week.  He has been wearing the sling full-time while up and moving, and removing it at rest.  Participating in physical therapy in [place] with William and continuing his at home exercises, including pendulum and elbow range of motion.  He states he is happy with his current progression.  Sutures remain intact. Denies Chest pain, Calve pain, Fever, Chills.    PHYSICAL EXAM:  There were no vitals taken for this visit.  There is no weight on file to calculate BMI.   GENERAL APPEARANCE: healthy, alert and no distress   PSYCH: mentation appears normal and affect normal/bright    MSK:  Left Shoulder  Incision clean and dry, Sutures present, healing.  Normal postop arthroscopic portal incisional erythema.   Ecchymosis: None.  Edema: None  CMS: pema incisional numbness, otherwise grossly intact to digits.  AROM: Full wrist/elbow ROM. Shoulder ROM deferred at this time    Radiology:   Intraoperative arthroscopic images reviewed with patient.      ASSESSMENT:  Joshua Vu is a 29 year old male who is seen in follow up for Left Shoulder Arthroscopic Bankart Labral Repair, Left Shoulder Remplissage (DOS:1/22/2025) with Dr. Arora, improving appropriately.      PLAN:  - Surgery discussed, images reviewed, and all questions were answered at this time.  - Sutures removed with  sterile technique, steri-strips applied in usual fashion, care instructions given and verbally acknowledged.  - May allow soap/water to run over incision. Avoid submerging in pool/tub x2 weeks.  - Medications: OTC PRN, discussed that he may discontinue ibuprofen and Tylenol as tolerated.  - Physical Therapy: As instructed / RICE and PROM. Reviewed shoulder pendulum exercises today in clinic. Patient performed pendulum exercises today in clinic.  Continue to work on at home and with formal PT.  Patient noted that he had a copy of physical therapy instructions and will continue to follow guidelines.  -Shoulder immobilizer: Will remain in shoulder immobilizer full-time for a minimum of 6 weeks total.  Will review at next postoperative visit on 3/10/2025.  Discussed importance of wearing shoulder immobilizer while up and moving.  May remove for shoulder, elbow, wrist range of motion exercises.      Return on 3/10/2025 for recheck with Dr. Maite Bobo PA-C  Physician Assistant   Oncology and Adult Reconstructive Surgery  Dept Orthopaedic Surgery, [facility]      2/12/2025      Again, thank you for allowing me to participate in the care of your patient.        Sincerely,        Orlando Bobo PA-C    Electronically signed

## 2025-02-19 ENCOUNTER — THERAPY VISIT (OUTPATIENT)
Dept: PHYSICAL THERAPY | Facility: CLINIC | Age: 30
End: 2025-02-19
Payer: COMMERCIAL

## 2025-02-19 DIAGNOSIS — S43.432A BANKART LESION OF LEFT SHOULDER, INITIAL ENCOUNTER: Primary | ICD-10-CM

## 2025-02-19 DIAGNOSIS — Z98.890 STATUS POST LABRAL REPAIR OF SHOULDER: ICD-10-CM

## 2025-02-19 PROCEDURE — 97110 THERAPEUTIC EXERCISES: CPT | Mod: GP | Performed by: PHYSICAL THERAPIST

## 2025-02-26 ENCOUNTER — THERAPY VISIT (OUTPATIENT)
Dept: PHYSICAL THERAPY | Facility: CLINIC | Age: 30
End: 2025-02-26
Payer: COMMERCIAL

## 2025-02-26 DIAGNOSIS — Z98.890 STATUS POST LABRAL REPAIR OF SHOULDER: ICD-10-CM

## 2025-02-26 DIAGNOSIS — S43.432A BANKART LESION OF LEFT SHOULDER, INITIAL ENCOUNTER: Primary | ICD-10-CM

## 2025-02-26 PROCEDURE — 97110 THERAPEUTIC EXERCISES: CPT | Mod: GP | Performed by: PHYSICAL THERAPIST

## 2025-03-05 ENCOUNTER — THERAPY VISIT (OUTPATIENT)
Dept: PHYSICAL THERAPY | Facility: CLINIC | Age: 30
End: 2025-03-05
Payer: COMMERCIAL

## 2025-03-05 DIAGNOSIS — Z98.890 STATUS POST LABRAL REPAIR OF SHOULDER: ICD-10-CM

## 2025-03-05 DIAGNOSIS — S43.432A BANKART LESION OF LEFT SHOULDER, INITIAL ENCOUNTER: Primary | ICD-10-CM

## 2025-03-05 PROCEDURE — 97110 THERAPEUTIC EXERCISES: CPT | Mod: GP

## 2025-03-05 NOTE — PROGRESS NOTES
PT PROGRESS NOTE    ASSESSMENT  Pt is making good progress at 6 weeks s/p labral repair. He has progressed to cane/pulley exercises and tolerates well. Mostly ready to discharge sling.     PLAN  Continue PT 1x/week through April then decrease to every other week.     GOALS  Limited progress towards goals at this phase; anticipate will meet by end of PT POC. See flowsheet.      03/05/25 0500   Appointment Info   Signing clinician's name / credentials William Person, PT, DPT; Lynette Barth, SPT   Total/Authorized Visits ET   Visits Used 6   PT Tx Diagnosis Left shoulder pain, aftercare s/p Left shoulder labral repair   Precautions/Limitations 6 anchors front 2 anchors back   Other pertinent information Climbing, [facility]   Progress Note/Certification   Onset of illness/injury or Date of Surgery 01/22/25   Therapy Frequency 2 x week   Predicted Duration 6 weeks, 1 x week for 6 weeks, 2 x month for 3 months   Progress Note Due Date 07/29/25   Progress Note Completed Date 01/29/25   GOALS   PT Goals 2;3   PT Goal 1   Goal Identifier Reaching   Goal Description Pt will be able to reach overhead, out to the side, behind the back and cross body pain free in order to reach cabinets, for dressing, and ADls   Rationale to maximize safety and independence with performance of ADLs and functional tasks;to maximize safety and independence within the home;to maximize safety and independence within the community;to maximize safety and independence with transportation;to maximize safety and independence with self cares   Goal Progress mild progress   Target Date 04/23/25   PT Goal 2   Goal Identifier Lifting   Goal Description Pt will be able to lift > 5 pounds overhead pain free in order to place things into high spaces with ADLs at home, and perform daily tasks at home   Rationale to maximize safety and independence with performance of ADLs and functional tasks;to maximize safety and independence within the home;to maximize  safety and independence within the community;to maximize safety and independence with transportation;to maximize safety and independence with self cares   Goal Progress limited   Target Date 05/21/25   PT Goal 3   Goal Identifier Sports   Goal Description Patient will be able to return to full sport/lesuire activity pain free without modification to level equal or greater than level prior to injury   Rationale to maximize safety and independence within the community;to maximize safety and independence within the home;to maximize safety and independence with performance of ADLs and functional tasks;to maximize safety and independence with self cares;to maximize safety and independence with transportation   Goal Progress limited   Target Date 07/29/25   Subjective Report   Subjective Report 6 weeks out, continued improvement. Shoulder has been sore after longer walks.   Objective Measures   Objective Measures Objective Measure 1   Objective Measure 1   Objective Measure PROM   Details Flexion 130, Abduction 95, ER in 30 abduction 20,  IR in 30 abduction 50 ER in 90 abd 25   Treatment Interventions (PT)   Interventions Therapeutic Procedure/Exercise;Therapeutic Activity   Therapeutic Procedure/Exercise   Therapeutic Procedures: strength, endurance, ROM, flexibility minutes (45966) 40   Therapeutic Procedures Ther Proc 2   Ther Proc 1 Education   Ther Proc 1 - Details Established POC, discussed MALIK/PHYS of pain, HEP frequency, and activity mod   Ther Proc 2 PROM   Ther Proc 2 - Details x 20 min within precautions all planes left shoulder.   PTRx Ther Proc 1 Pendulum/Codmans   PTRx Ther Proc 1 - Details perf in clinic   PTRx Ther Proc 2 Elbow Active Range of Motion Flexion in Supination   PTRx Ther Proc 2 - Details x20 with 2lb weight   PTRx Ther Proc 3 Scapular Retraction/Depression   PTRx Ther Proc 3 - Details HEP   PTRx Ther Proc 4 Rowing with Shoulders Up and Back   PTRx Ther Proc 4 - Details BTB x20   PTRx Ther  Proc 5 Isometric Shoulder External Rotation   PTRx Ther Proc 5 - Details HEP   PTRx Ther Proc 6 Shoulder Isometric Internal Rotation   PTRx Ther Proc 6 - Details HEP   PTRx Ther Proc 7 Standing Passive Shoulder Flexion   PTRx Ther Proc 7 - Details NT   PTRx Ther Proc 8 Wand Shoulder External Rotation in Standing   PTRx Ther Proc 8 - Details x 10L with 5 sec holds    PTRx Ther Proc 9 Wand Shoulder Flexion in Standing   PTRx Ther Proc 9 - Details NT   PTRx Ther Proc 10 Wand Shoulder Abduction Standing   PTRx Ther Proc 10 - Details NT   PTRx Ther Proc 11 Pulley Shoulder Flexion   PTRx Ther Proc 11 - Details x 5 min    PTRx Ther Proc 12 Supine Wand ER at 45 Degrees Shoulder Abduction   PTRx Ther Proc 12 - Details x 10 with 5 sec holds   PTRx Ther Proc 13 Wand Shoulder Extension Standing   PTRx Ther Proc 13 - Details NT   PTRx Ther Proc 14 Shoulder Theraband Low Row/Pulldown   PTRx Ther Proc 14 - Details x 20 GTB tolerates well overall. cues for form and control throughout    PTRx Ther Proc 15 Supine AAROM Shoulder Flexion   PTRx Ther Proc 15 - Details x 10L with 5 sec holds   Skilled Intervention Cues for form and control throughout   Patient Response/Progress Tolerated initial HEP well   PTRx Ther Proc 16 Prone Scapula I   PTRx Ther Proc 16 - Details x10 good challenge    PTRx Ther Proc 17 Reverse Pendulum Supine or Sidelying   PTRx Ther Proc 17 - Details x10 CCW/CW   Therapeutic Activity   Ther Act 1 education   Ther Act 1 - Details discussion on POC and precautions with labral repair protocol, sling ussage and timeline   Skilled Intervention skilled education   Patient Response/Progress verb understanding   Education   Learner/Method Patient;No Barriers to Learning   Education Comments no concerns   Plan   Home program Ptrx HEP   Plan for next session Re-assess, progress as able   Total Session Time   Timed Code Treatment Minutes 40   Total Treatment Time (sum of timed and untimed services) 40

## 2025-03-10 ENCOUNTER — OFFICE VISIT (OUTPATIENT)
Dept: ORTHOPEDICS | Facility: CLINIC | Age: 30
End: 2025-03-10
Payer: COMMERCIAL

## 2025-03-10 DIAGNOSIS — Z98.890 S/P ARTHROSCOPY OF LEFT SHOULDER: Primary | ICD-10-CM

## 2025-03-10 PROCEDURE — 99024 POSTOP FOLLOW-UP VISIT: CPT | Performed by: STUDENT IN AN ORGANIZED HEALTH CARE EDUCATION/TRAINING PROGRAM

## 2025-03-10 NOTE — PROGRESS NOTES
CC: 6 weeks s/p left shoulder labral repair and remplissage    HPI: Patient is a 29-year-old male seen here today now 6 weeks status post left shoulder arthroscopic labral repair and remplissage.  Overall he is doing quite well.  Is been doing physical therapy at our [place] location.  They are working on active and passive range of motion.  Is weaned from his sling.  He is having some occasional pain down the front and lateral side of his arm.  Overall he is weaned from his pain medication    Objective:   PE:  LUE: Well-healed surgical incisions about the left shoulder.  No erythema or drainage.  Passive range of motion is 140 degrees of forward elevation, 130 degrees abduction, 20 degrees external rotation.  Active range of motion is equivalent to passive range of motion.  Sensation tact in axillary, radial, median, and ulnar nerve distribution.  2+ radial pulse    A/P:  Patient is a 29-year-old male seen here today 6 weeks status post left shoulder arthroscopic labral repair and remplissage.  Overall he is doing quite well.  His range of motion is actually very good for 6 weeks out.  He will continue physical therapy per the [facility] shoulder instability protocol.  He will work on active and passive range of motion and gentle strengthening.  Is not cleared for heavy lifting or to return to overhead sporting activity.  He will follow-up with me in 6 weeks    Wiliam Arora MD    [facility]   Department of Orthopedic Surgery      Disclaimer: This note consists of symbols derived from keyboarding, dictation and/or voice recognition software. As a result, there may be errors in the script that have gone undetected. Please consider this when interpreting information found in this chart.

## 2025-03-10 NOTE — LETTER
3/10/2025      Joshua Vu  [address]  [place] [address]      Dear Colleague,    Thank you for referring your patient, Joshua Vu, to the [facility] [place]. Please see a copy of my visit note below.    CC: 6 weeks s/p left shoulder labral repair and remplissage    HPI: Patient is a 29-year-old male seen here today now 6 weeks status post left shoulder arthroscopic labral repair and remplissage.  Overall he is doing quite well.  Is been doing physical therapy at our [place] location.  They are working on active and passive range of motion.  Is weaned from his sling.  He is having some occasional pain down the front and lateral side of his arm.  Overall he is weaned from his pain medication    Objective:   PE:  LUE: Well-healed surgical incisions about the left shoulder.  No erythema or drainage.  Passive range of motion is 140 degrees of forward elevation, 130 degrees abduction, 20 degrees external rotation.  Active range of motion is equivalent to passive range of motion.  Sensation tact in axillary, radial, median, and ulnar nerve distribution.  2+ radial pulse    A/P:  Patient is a 29-year-old male seen here today 6 weeks status post left shoulder arthroscopic labral repair and remplissage.  Overall he is doing quite well.  His range of motion is actually very good for 6 weeks out.  He will continue physical therapy per the [facility] shoulder instability protocol.  He will work on active and passive range of motion and gentle strengthening.  Is not cleared for heavy lifting or to return to overhead sporting activity.  He will follow-up with me in 6 weeks    Wiliam Arora MD    [facility]   Department of Orthopedic Surgery      Disclaimer: This note consists of symbols derived from keyboarding, dictation and/or voice recognition software. As a result, there may be errors in the script that have gone  undetected. Please consider this when interpreting information found in this chart.         Again, thank you for allowing me to participate in the care of your patient.        Sincerely,        Wiliam Arora MD    Electronically signed

## 2025-03-12 ENCOUNTER — THERAPY VISIT (OUTPATIENT)
Dept: PHYSICAL THERAPY | Facility: CLINIC | Age: 30
End: 2025-03-12
Payer: COMMERCIAL

## 2025-03-12 DIAGNOSIS — S43.432A BANKART LESION OF LEFT SHOULDER, INITIAL ENCOUNTER: Primary | ICD-10-CM

## 2025-03-12 DIAGNOSIS — Z98.890 STATUS POST LABRAL REPAIR OF SHOULDER: ICD-10-CM

## 2025-03-12 PROCEDURE — 97110 THERAPEUTIC EXERCISES: CPT | Mod: GP | Performed by: PHYSICAL THERAPIST

## 2025-03-18 ENCOUNTER — MYC MEDICAL ADVICE (OUTPATIENT)
Dept: ORTHOPEDICS | Facility: CLINIC | Age: 30
End: 2025-03-18
Payer: COMMERCIAL

## 2025-03-19 ENCOUNTER — THERAPY VISIT (OUTPATIENT)
Dept: PHYSICAL THERAPY | Facility: CLINIC | Age: 30
End: 2025-03-19
Payer: COMMERCIAL

## 2025-03-19 DIAGNOSIS — Z98.890 STATUS POST LABRAL REPAIR OF SHOULDER: ICD-10-CM

## 2025-03-19 DIAGNOSIS — S43.432A BANKART LESION OF LEFT SHOULDER, INITIAL ENCOUNTER: Primary | ICD-10-CM

## 2025-03-19 PROCEDURE — 97110 THERAPEUTIC EXERCISES: CPT | Mod: GP | Performed by: PHYSICAL THERAPIST

## 2025-03-26 ENCOUNTER — THERAPY VISIT (OUTPATIENT)
Dept: PHYSICAL THERAPY | Facility: CLINIC | Age: 30
End: 2025-03-26
Payer: COMMERCIAL

## 2025-03-26 DIAGNOSIS — Z98.890 STATUS POST LABRAL REPAIR OF SHOULDER: ICD-10-CM

## 2025-03-26 DIAGNOSIS — S43.432A BANKART LESION OF LEFT SHOULDER, INITIAL ENCOUNTER: Primary | ICD-10-CM

## 2025-03-26 PROCEDURE — 97110 THERAPEUTIC EXERCISES: CPT | Mod: GP | Performed by: PHYSICAL THERAPIST

## 2025-04-02 ENCOUNTER — THERAPY VISIT (OUTPATIENT)
Dept: PHYSICAL THERAPY | Facility: CLINIC | Age: 30
End: 2025-04-02
Payer: COMMERCIAL

## 2025-04-02 DIAGNOSIS — Z98.890 STATUS POST LABRAL REPAIR OF SHOULDER: ICD-10-CM

## 2025-04-02 DIAGNOSIS — S43.432A BANKART LESION OF LEFT SHOULDER, INITIAL ENCOUNTER: Primary | ICD-10-CM

## 2025-04-02 PROCEDURE — 97110 THERAPEUTIC EXERCISES: CPT | Mod: GP | Performed by: PHYSICAL THERAPIST

## 2025-04-09 ENCOUNTER — THERAPY VISIT (OUTPATIENT)
Dept: PHYSICAL THERAPY | Facility: CLINIC | Age: 30
End: 2025-04-09
Payer: COMMERCIAL

## 2025-04-09 DIAGNOSIS — Z98.890 STATUS POST LABRAL REPAIR OF SHOULDER: ICD-10-CM

## 2025-04-09 DIAGNOSIS — S43.432A BANKART LESION OF LEFT SHOULDER, INITIAL ENCOUNTER: Primary | ICD-10-CM

## 2025-04-09 PROCEDURE — 97110 THERAPEUTIC EXERCISES: CPT | Mod: GP | Performed by: PHYSICAL THERAPIST

## 2025-04-16 ENCOUNTER — THERAPY VISIT (OUTPATIENT)
Dept: PHYSICAL THERAPY | Facility: CLINIC | Age: 30
End: 2025-04-16
Payer: COMMERCIAL

## 2025-04-16 DIAGNOSIS — Z98.890 STATUS POST LABRAL REPAIR OF SHOULDER: ICD-10-CM

## 2025-04-16 DIAGNOSIS — S43.432A BANKART LESION OF LEFT SHOULDER, INITIAL ENCOUNTER: Primary | ICD-10-CM

## 2025-04-16 PROCEDURE — 97110 THERAPEUTIC EXERCISES: CPT | Mod: GP | Performed by: PHYSICAL THERAPIST

## 2025-04-16 NOTE — PROGRESS NOTES
PLAN  12 weeks outs left shoulder labral repair. Near full Passive range, progressing towards AROM and strengthening. Returns to Dr. Arora next week. Continue therapy per current plan of care.     04/16/25 0500   Appointment Info   Signing clinician's name / credentials William Person, PT, DPT   Total/Authorized Visits ET   Visits Used 11   PT Tx Diagnosis Left shoulder pain, aftercare s/p Left shoulder labral repair   Precautions/Limitations 6 anchors front 2 anchors back   Other pertinent information Climbing   Progress Note/Certification   Onset of illness/injury or Date of Surgery 01/22/25   Therapy Frequency 2 x week   Predicted Duration 6 weeks, 1 x week for 6 weeks, 2 x month for 3 months   Progress Note Due Date 07/29/25   Progress Note Completed Date 01/29/25   GOALS   PT Goals 2;3   PT Goal 1   Goal Identifier Reaching   Goal Description Pt will be able to reach overhead, out to the side, behind the back and cross body pain free in order to reach cabinets, for dressing, and ADls   Rationale to maximize safety and independence with performance of ADLs and functional tasks;to maximize safety and independence within the home;to maximize safety and independence within the community;to maximize safety and independence with transportation;to maximize safety and independence with self cares   Goal Progress nearly met   Target Date 04/23/25   PT Goal 2   Goal Identifier Lifting   Goal Description Pt will be able to lift > 5 pounds overhead pain free in order to place things into high spaces with ADLs at home, and perform daily tasks at home   Rationale to maximize safety and independence with performance of ADLs and functional tasks;to maximize safety and independence within the home;to maximize safety and independence within the community;to maximize safety and independence with transportation;to maximize safety and independence with self cares   Goal Progress progressing   Target Date 05/21/25   PT Goal 3    Goal Identifier Sports   Goal Description Patient will be able to return to full sport/lesuire activity pain free without modification to level equal or greater than level prior to injury   Rationale to maximize safety and independence within the community;to maximize safety and independence within the home;to maximize safety and independence with performance of ADLs and functional tasks;to maximize safety and independence with self cares;to maximize safety and independence with transportation   Goal Progress mild progress   Target Date 07/29/25   Subjective Report   Subjective Report Improving but can can get sore from some of the exercises. .   Objective Measures   Objective Measures Objective Measure 1   Objective Measure 1   Objective Measure PROM   Details Flexion 175, Abduction 175, ER in 30 abduction 85, ER in 90 abduction 88, ER in 0 abduction 75, IR in 30 abduction 70   Treatment Interventions (PT)   Interventions Therapeutic Procedure/Exercise;Therapeutic Activity;Neuromuscular Re-education   Therapeutic Procedure/Exercise   Therapeutic Procedures: strength, endurance, ROM, flexibility minutes (48382) 40   Therapeutic Procedures Ther Proc 2   Ther Proc 1 Education   Ther Proc 1 - Details Established POC, discussed MALIK/PHYS of pain, HEP frequency, and activity mod   Ther Proc 2 PROM   Ther Proc 2 - Details x 20 min within precautions all planes left shoulder.   PTRx Ther Proc 1 Standing Passive Shoulder Flexion   PTRx Ther Proc 1 - Details NT   PTRx Ther Proc 2 Elbow Active Range of Motion Flexion in Supination   PTRx Ther Proc 2 - Details progressed to 5 pounds x 10    PTRx Ther Proc 3 Rowing with Shoulders Up and Back   PTRx Ther Proc 3 - Details Orange band x 10 with 5 sec holds today   PTRx Ther Proc 4 Wand Shoulder External Rotation in Standing   PTRx Ther Proc 4 - Details x 10L with 5 sec holds    PTRx Ther Proc 5 Wand Shoulder Flexion in Standing   PTRx Ther Proc 5 - Details x 10L with 5 sec holds    PTRx Ther Proc 6 Wand Shoulder Abduction Standing   PTRx Ther Proc 6 - Details NT   PTRx Ther Proc 7 Pulley Shoulder Flexion   PTRx Ther Proc 7 - Details HEP   PTRx Ther Proc 8 Wand Shoulder External Rotation at 90 degrees Abduction   PTRx Ther Proc 8 - Details x 20L    PTRx Ther Proc 9 Wand Shoulder Extension Standing   PTRx Ther Proc 9 - Details x 10 L   PTRx Ther Proc 10 Shoulder Theraband Low Row/Pulldown   PTRx Ther Proc 10 - Details black band x 20 tolerates well overall    PTRx Ther Proc 11 Supine AAROM Shoulder Flexion   PTRx Ther Proc 11 - Details x 10L with 5 sec holds   PTRx Ther Proc 12 Reverse Pendulum Supine or Sidelying   PTRx Ther Proc 12 - Details HEP   PTRx Ther Proc 13 Wand Shoulder Internal Rotation   PTRx Ther Proc 13 - Details   x10   PTRx Ther Proc 14 Prone Shoulder Extension   PTRx Ther Proc 14 - Details HEP   PTRx Ther Proc 15 Supine Ceiling Punch   PTRx Ther Proc 15 - Details HEP   PTRx Ther Proc 16 Shoulder Scaption Full Can   PTRx Ther Proc 16 - Details progressed to 5 pounds x 20    PTRx Ther Proc 17 Shoulder External Rotation Sidelying   PTRx Ther Proc 17 - Details HEP   PTRx Ther Proc 18 Scapular Stabilization/Proprioception With A Ball   PTRx Ther Proc 18 - Details HEP   PTRx Ther Proc 19 Ball Prone Scapula T (Thumbs Up)   PTRx Ther Proc 19 - Details x 20 good challenge    PTRx Ther Proc 20 Ball Prone Scapula W   PTRx Ther Proc 20 - Details x 20    Skilled Intervention Cues for form and control throughout   Patient Response/Progress Tolerated initial HEP well   Therapeutic Activity   Ther Act 1 education   Ther Act 1 - Details discussion on POC and precautions with labral repair protocol, sling ussage and timeline   Skilled Intervention skilled education   Patient Response/Progress verb understanding   Neuromuscular Re-education   PTRx Neuro Re-ed 1 Push-Up Plus At Counter   PTRx Neuro Re-ed 1 - Details TE x 15   PTRx Neuro Re-ed 2 Dumbbell Two Point Row   PTRx Neuro Re-ed 2 -  Details TE progressed to 10 pounds x 15   PTRx Neuro Re-ed 3 Ball Prone Scapula Y   PTRx Neuro Re-ed 3 - Details TE x 10   Education   Learner/Method Patient;No Barriers to Learning   Education Comments no concerns   Plan   Home program Ptrx HEP   Plan for next session PN PT every other week for 3 months    Total Session Time   Timed Code Treatment Minutes 40   Total Treatment Time (sum of timed and untimed services) 40       Beginning/End Dates of Progress Note Reporting Period:  01/29/25 to 04/16/2025    Referring Provider:  Wiliam Arora

## 2025-04-21 ENCOUNTER — ANCILLARY PROCEDURE (OUTPATIENT)
Dept: GENERAL RADIOLOGY | Facility: CLINIC | Age: 30
End: 2025-04-21
Attending: STUDENT IN AN ORGANIZED HEALTH CARE EDUCATION/TRAINING PROGRAM
Payer: COMMERCIAL

## 2025-04-21 ENCOUNTER — OFFICE VISIT (OUTPATIENT)
Dept: ORTHOPEDICS | Facility: CLINIC | Age: 30
End: 2025-04-21
Payer: COMMERCIAL

## 2025-04-21 DIAGNOSIS — Z98.890 S/P ARTHROSCOPY OF LEFT SHOULDER: Primary | ICD-10-CM

## 2025-04-21 DIAGNOSIS — Z98.890 S/P ARTHROSCOPY OF LEFT SHOULDER: ICD-10-CM

## 2025-04-21 PROCEDURE — 73020 X-RAY EXAM OF SHOULDER: CPT | Mod: TC | Performed by: STUDENT IN AN ORGANIZED HEALTH CARE EDUCATION/TRAINING PROGRAM

## 2025-04-21 PROCEDURE — 99024 POSTOP FOLLOW-UP VISIT: CPT | Performed by: STUDENT IN AN ORGANIZED HEALTH CARE EDUCATION/TRAINING PROGRAM

## 2025-04-21 NOTE — LETTER
4/21/2025      Joshua Vu  [address]  [place] [address]      Dear Colleague,    Thank you for referring your patient, Joshua Vu, to the [facility] [place]. Please see a copy of my visit note below.    CC: 3-month status post left shoulder arthroscopic labral repair and remplissage.    HPI: Patient is a 29-year-old male known to my practice now 3-month status post left shoulder arthroscopic labral repair and remplissage.  He has been doing physical therapy at [facility].  They are working on range of motion and strengthening.  Overall he is done very well regaining his range of motion.  He still notes some tightness with abduction and external rotation.  He does not note any feelings of instability.  No anterior shoulder pain.  He desires to return to [facility].    Objective:   PE:  LUE: Well-healed surgical incisions about the left shoulder.  Passive range of motion is 170 degrees of forward elevation, 160 degrees abduction, 60 degrees external rotation with his arm at side.  At 90 degrees abduction he has 30 degrees external rotation past neutral.  There is approximately 10 degrees shy of his contralateral side.  No anterior shoulder pain.  5/5 strength in flexion, abduction, internal and external rotation.  No apprehension with abduction and external rotation.    Imaging:   Axillary x-ray shows well-maintained glenohumeral joint space and no signs of subluxation.    A/P:  Patient is a 29-year-old male seen here today now 3-month status post left shoulder arthroscopic labral repair and remplissage.  Overall is done a really nice job regaining his range of motion.  He is working on strengthening with physical therapy.  From my standpoint he does not have any restrictions on active and passive range of motion.  He may continue strengthening and return to sport as symptoms allow.  He will continue physical therapy at [facility].   He will follow-up with me in 3 months for final clearance.    Wiliam Arora MD    [facility]   Department of Orthopedic Surgery      Disclaimer: This note consists of symbols derived from keyboarding, dictation and/or voice recognition software. As a result, there may be errors in the script that have gone undetected. Please consider this when interpreting information found in this chart.         Again, thank you for allowing me to participate in the care of your patient.        Sincerely,        Wiliam Arora MD    Electronically signed

## 2025-04-29 ENCOUNTER — THERAPY VISIT (OUTPATIENT)
Dept: PHYSICAL THERAPY | Facility: CLINIC | Age: 30
End: 2025-04-29
Payer: COMMERCIAL

## 2025-04-29 DIAGNOSIS — Z98.890 STATUS POST LABRAL REPAIR OF SHOULDER: ICD-10-CM

## 2025-04-29 DIAGNOSIS — S43.432A BANKART LESION OF LEFT SHOULDER, INITIAL ENCOUNTER: Primary | ICD-10-CM

## 2025-04-29 PROCEDURE — 97110 THERAPEUTIC EXERCISES: CPT | Mod: GP | Performed by: PHYSICAL THERAPIST

## 2025-05-13 ENCOUNTER — THERAPY VISIT (OUTPATIENT)
Dept: PHYSICAL THERAPY | Facility: CLINIC | Age: 30
End: 2025-05-13
Payer: COMMERCIAL

## 2025-05-13 DIAGNOSIS — S43.432A BANKART LESION OF LEFT SHOULDER, INITIAL ENCOUNTER: Primary | ICD-10-CM

## 2025-05-13 DIAGNOSIS — Z98.890 STATUS POST LABRAL REPAIR OF SHOULDER: ICD-10-CM

## 2025-05-13 PROCEDURE — 97110 THERAPEUTIC EXERCISES: CPT | Mod: GP | Performed by: PHYSICAL THERAPIST

## 2025-05-27 ENCOUNTER — THERAPY VISIT (OUTPATIENT)
Dept: PHYSICAL THERAPY | Facility: CLINIC | Age: 30
End: 2025-05-27
Payer: COMMERCIAL

## 2025-05-27 DIAGNOSIS — Z98.890 STATUS POST LABRAL REPAIR OF SHOULDER: ICD-10-CM

## 2025-05-27 DIAGNOSIS — S43.432A BANKART LESION OF LEFT SHOULDER, INITIAL ENCOUNTER: Primary | ICD-10-CM

## 2025-05-27 PROCEDURE — 97110 THERAPEUTIC EXERCISES: CPT | Mod: GP | Performed by: PHYSICAL THERAPIST

## 2025-06-04 ENCOUNTER — TELEPHONE (OUTPATIENT)
Dept: ORTHOPEDICS | Facility: CLINIC | Age: 30
End: 2025-06-04
Payer: COMMERCIAL

## 2025-06-09 ENCOUNTER — THERAPY VISIT (OUTPATIENT)
Dept: PHYSICAL THERAPY | Facility: CLINIC | Age: 30
End: 2025-06-09
Payer: COMMERCIAL

## 2025-06-09 DIAGNOSIS — S43.432A BANKART LESION OF LEFT SHOULDER, INITIAL ENCOUNTER: Primary | ICD-10-CM

## 2025-06-09 DIAGNOSIS — Z98.890 STATUS POST LABRAL REPAIR OF SHOULDER: ICD-10-CM

## 2025-06-09 PROCEDURE — 97110 THERAPEUTIC EXERCISES: CPT | Mod: GP | Performed by: PHYSICAL THERAPIST

## 2025-06-30 ENCOUNTER — THERAPY VISIT (OUTPATIENT)
Dept: PHYSICAL THERAPY | Facility: CLINIC | Age: 30
End: 2025-06-30
Payer: COMMERCIAL

## 2025-06-30 DIAGNOSIS — Z98.890 STATUS POST LABRAL REPAIR OF SHOULDER: ICD-10-CM

## 2025-06-30 DIAGNOSIS — S43.432A BANKART LESION OF LEFT SHOULDER, INITIAL ENCOUNTER: Primary | ICD-10-CM

## 2025-06-30 PROCEDURE — 97110 THERAPEUTIC EXERCISES: CPT | Mod: GP | Performed by: PHYSICAL THERAPIST

## 2025-06-30 NOTE — PROGRESS NOTES
Over 5 months out post left labral repair. Great AROM/PROM. Near full strength all planes, starting to [place]mond with climbing. Contining to progress resistance overtime. Close to being ready to climb again discussed would like him to be able to do 5 strict pullups before fully starting.       PLAN  Continue therapy per current plan of care.     06/30/25 0500   Appointment Info   Signing clinician's name / credentials William Person, PT, DPT   Total/Authorized Visits ET   Visits Used 16   PT Tx Diagnosis Left shoulder pain, aftercare s/p Left shoulder labral repair   Precautions/Limitations 6 anchors front 2 anchors back   Other pertinent information Climbing, Friend of [facility]   Progress Note/Certification   Onset of illness/injury or Date of Surgery 01/22/25   Therapy Frequency 2 x week   Predicted Duration 6 weeks, 1 x week for 6 weeks, 2 x month for 3 months   Progress Note Due Date 07/29/25   Progress Note Completed Date 04/16/25   GOALS   PT Goals 2;3   PT Goal 1   Goal Identifier Reaching   Goal Description Pt will be able to reach overhead, out to the side, behind the back and cross body pain free in order to reach cabinets, for dressing, and ADls   Rationale to maximize safety and independence with performance of ADLs and functional tasks;to maximize safety and independence within the home;to maximize safety and independence within the community;to maximize safety and independence with transportation;to maximize safety and independence with self cares   Goal Progress met   Target Date 04/23/25   Date Met 06/30/25   PT Goal 2   Goal Identifier Lifting   Goal Description Pt will be able to lift > 5 pounds overhead pain free in order to place things into high spaces with ADLs at home, and perform daily tasks at home   Rationale to maximize safety and independence with performance of ADLs and functional tasks;to maximize safety and independence within the home;to maximize safety and independence within the community;to  maximize safety and independence with transportation;to maximize safety and independence with self cares   Goal Progress nearly met   Target Date 05/21/25   PT Goal 3   Goal Identifier Sports   Goal Description Patient will be able to return to full sport/lesuire activity pain free without modification to level equal or greater than level prior to injury   Rationale to maximize safety and independence within the community;to maximize safety and independence within the home;to maximize safety and independence with performance of ADLs and functional tasks;to maximize safety and independence with self cares;to maximize safety and independence with transportation   Goal Progress cont progress   Target Date 07/29/25   Subjective Report   Subjective Report Shoulder is doing really well. Practiced Bealaying and this went really well overall.   Objective Measures   Objective Measures Objective Measure 1;Objective Measure 2;Objective Measure 3   Objective Measure 1   Objective Measure PROM   Details Flexion 180, Abduction 180, ER in 90 abduction 90, IR in 90 abduction 70   Objective Measure 2   Objective Measure AROM   Details Flexion 180, Abduction 180, ER70, IR T7   Objective Measure 3   Objective Measure ER   Details ER 5/5, IR 5/5, Flexion 5/5, Abduction 5/5, MT 4+/5, LT 4+/5, ER90 4+/5   Treatment Interventions (PT)   Interventions Therapeutic Procedure/Exercise;Therapeutic Activity;Neuromuscular Re-education   Therapeutic Procedure/Exercise   Therapeutic Procedures: strength, endurance, ROM, flexibility minutes ([id number]) 40   Therapeutic Procedures Ther Proc 2   Ther Proc 1 Education   Ther Proc 1 - Details Established POC, discussed MALIK/PHYS of pain, HEP frequency, and activity mod   Ther Proc 2 Blaze pods   Ther Proc 2 - Details 3 x 30 sec high plank 37 hits best   PTRx Ther Proc 1 Bicep Curls with Dumbbells   PTRx Ther Proc 1 - Details HEP   PTRx Ther Proc 2 Rowing with Shoulders Up and Back   PTRx Ther Proc 2 -  Details HEP   PTRx Ther Proc 3 Shoulder Theraband Low Row/Pulldown   PTRx Ther Proc 3 - Details HEP   PTRx Ther Proc 4 Shoulder External Rotation Sidelying   PTRx Ther Proc 4 - Details progressed to 5 pounds x 25L    PTRx Ther Proc 5 Ball Prone Scapula T (Thumbs Up)   PTRx Ther Proc 5 - Details  3 pounds x 20   PTRx Ther Proc 6 Ball Prone Scapula W   PTRx Ther Proc 6 - Details HEP   PTRx Ther Proc 7 Ball Prone Scapula Y   PTRx Ther Proc 7 - Details HEP   PTRx Ther Proc 8 Dumbbell Two Point Row   PTRx Ther Proc 8 - Details discussed going heavier   PTRx Ther Proc 9 Prone Plank   PTRx Ther Proc 9 - Details HEP   PTRx Ther Proc 10 Lat Pulldown   PTRx Ther Proc 10 - Details 50 pounds in clinic x 20    PTRx Ther Proc 11 Shoulder Scaption Full Can   PTRx Ther Proc 11 - Details NT   PTRx Ther Proc 12 Shoulder Abduction   PTRx Ther Proc 12 - Details 10 pounds x 12   PTRx Ther Proc 13 Pushups   PTRx Ther Proc 13 - Details 4 x 15 at home    PTRx Ther Proc 14 Shoulder Press Overhead   PTRx Ther Proc 14 - Details 4 pounds x 20 tolerates well    PTRx Ther Proc 15 Theraband Shoulder External Rotation at 90/90   PTRx Ther Proc 15 - Details HEP   PTRx Ther Proc 16 Inverted Kettlebell Shoulder 90-90 Flexion Carry   PTRx Ther Proc 16 - Details HEP   PTRx Ther Proc 17 Push Up Plus with Alternating Shoulder Tap   PTRx Ther Proc 17 - Details x 10B   Skilled Intervention Cues for form and control throughout   Patient Response/Progress progressing weight, reps   Therapeutic Activity   Ther Act 1 education   Ther Act 1 - Details discussion on POC and precautions with labral repair protocol, sling ussage and timeline   Skilled Intervention skilled education   Patient Response/Progress verb understanding   Neuromuscular Re-education   PTRx Neuro Re-ed 1 Push-Up Plus At Counter   PTRx Neuro Re-ed 1 - Details TE x 15   PTRx Neuro Re-ed 2 Dumbbell Two Point Row   PTRx Neuro Re-ed 2 - Details TE progressed to 10 pounds x 15   PTRx Neuro Re-ed 3  Ball Prone Scapula Y   PTRx Neuro Re-ed 3 - Details TE x 10   Education   Learner/Method Patient;No Barriers to Learning   Education Comments no concerns   Plan   Home program Ptrx HEP   Plan for next session return in 6 weeks   Total Session Time   Timed Code Treatment Minutes 40   Total Treatment Time (sum of timed and untimed services) 40       Beginning/End Dates of Progress Note Reporting Period:  04/16/25 to 06/30/2025    Referring Provider:  Wiliam Arora

## 2025-07-03 ENCOUNTER — OFFICE VISIT (OUTPATIENT)
Dept: ORTHOPEDICS | Facility: CLINIC | Age: 30
End: 2025-07-03
Payer: COMMERCIAL

## 2025-07-03 DIAGNOSIS — Z98.890 S/P ARTHROSCOPY OF LEFT SHOULDER: Primary | ICD-10-CM

## 2025-07-03 NOTE — PROGRESS NOTES
CC: 6 month s/p Left Shoulder arthroscopic Bankart labral repair and remplissage  .  HPI: Patient is 6 month status-post a left shoulder scopic labral repair and remplissage.  Overall, he is progressing very well with physical therapy. They have returned to all daily activities without pain.  He has not yet returned to high-level rockclimbing.  This is desired recreational activity.  He continues to do physical therapy with William in [place] with a goal of returning to rockclimbing this summer.  He cites strength endurance and confidence as his main barriers to return to rockclimbing.  Denies any pain in his shoulder.  He denies any feelings of instability    Objective:   PE:  LUE:   Well healed surgical incision about the shoulder.     PROM: 480 forward flexion, 170 abduction, 60 ER, L1 IR.   AROM: Physical with passive range of motion  5/5 strength in flex/adb/ER/IR  2+ radial pulse.  SILT: axillary, radial, median, and ulnar nerve distributions.    A/P:  Patient is now 6 months status-post Left Shoulder arthroscopic Bankart labral repair and remplissage    -cleared to return to all desired activity without restriction.  -I have cleared them to return to work without restriction.   -I discussed the documented 5-10% risk of r recurrent instability over the next 5 years. We discussed the risk of glenohumeral arthritis in the future.    Patient will follow-up in 6 months in clinic.    Wiliam Arora MD    [facility]   Department of Orthopedic Surgery      Disclaimer: This note consists of symbols derived from keyboarding, dictation and/or voice recognition software. As a result, there may be errors in the script that have gone undetected. Please consider this when interpreting information found in this chart.

## 2025-07-03 NOTE — LETTER
7/3/2025      Joshua Vu  [address]  [place] [address]      Dear Colleague,    Thank you for referring your patient, Joshua Vu, to the [facility] [place]. Please see a copy of my visit note below.    CC: 6 month s/p Left Shoulder arthroscopic Bankart labral repair and remplissage  .  HPI: Patient is 6 month status-post a left shoulder scopic labral repair and remplissage.  Overall, he is progressing very well with physical therapy. They have returned to all daily activities without pain.  He has not yet returned to high-level rockclimbing.  This is desired recreational activity.  He continues to do physical therapy with William in [place] with a goal of returning to rockclimbing this summer.  He cites strength endurance and confidence as his main barriers to return to rockclimbing.  Denies any pain in his shoulder.  He denies any feelings of instability    Objective:   PE:  LUE:   Well healed surgical incision about the shoulder.     PROM: 480 forward flexion, 170 abduction, 60 ER, L1 IR.   AROM: Physical with passive range of motion  5/5 strength in flex/adb/ER/IR  2+ radial pulse.  SILT: axillary, radial, median, and ulnar nerve distributions.    A/P:  Patient is now 6 months status-post Left Shoulder arthroscopic Bankart labral repair and remplissage    -cleared to return to all desired activity without restriction.  -I have cleared them to return to work without restriction.   -I discussed the documented 5-10% risk of r recurrent instability over the next 5 years. We discussed the risk of glenohumeral arthritis in the future.    Patient will follow-up in 6 months in clinic.    Wiliam Arora MD    [facility]   Department of Orthopedic Surgery      Disclaimer: This note consists of symbols derived from keyboarding, dictation and/or voice recognition software. As a result, there may be errors in the script that  have gone undetected. Please consider this when interpreting information found in this chart.       Again, thank you for allowing me to participate in the care of your patient.        Sincerely,        Wiliam Arora MD    Electronically signed